# Patient Record
Sex: MALE | Employment: FULL TIME | URBAN - METROPOLITAN AREA
[De-identification: names, ages, dates, MRNs, and addresses within clinical notes are randomized per-mention and may not be internally consistent; named-entity substitution may affect disease eponyms.]

---

## 2017-10-03 ENCOUNTER — APPOINTMENT (OUTPATIENT)
Dept: GENERAL RADIOLOGY | Facility: CLINIC | Age: 22
End: 2017-10-03
Attending: EMERGENCY MEDICINE
Payer: COMMERCIAL

## 2017-10-03 ENCOUNTER — HOSPITAL ENCOUNTER (EMERGENCY)
Facility: CLINIC | Age: 22
Discharge: HOME | End: 2017-10-04
Attending: EMERGENCY MEDICINE | Admitting: PODIATRIST
Payer: COMMERCIAL

## 2017-10-03 DIAGNOSIS — S92.512B OPEN DISPLACED FRACTURE OF PROXIMAL PHALANX OF LESSER TOE OF LEFT FOOT, INITIAL ENCOUNTER: ICD-10-CM

## 2017-10-03 DIAGNOSIS — Z98.890 S/P FOOT SURGERY, LEFT: Primary | ICD-10-CM

## 2017-10-03 PROCEDURE — 28515 TREATMENT OF TOE FRACTURE: CPT | Mod: T1

## 2017-10-03 PROCEDURE — 73630 X-RAY EXAM OF FOOT: CPT | Mod: LT

## 2017-10-03 PROCEDURE — 25000132 ZZH RX MED GY IP 250 OP 250 PS 637: Performed by: EMERGENCY MEDICINE

## 2017-10-03 PROCEDURE — 99285 EMERGENCY DEPT VISIT HI MDM: CPT | Mod: 25

## 2017-10-03 RX ORDER — HYDROCODONE BITARTRATE AND ACETAMINOPHEN 5; 325 MG/1; MG/1
1 TABLET ORAL ONCE
Status: COMPLETED | OUTPATIENT
Start: 2017-10-03 | End: 2017-10-03

## 2017-10-03 RX ADMIN — HYDROCODONE BITARTRATE AND ACETAMINOPHEN 1 TABLET: 5; 325 TABLET ORAL at 23:00

## 2017-10-03 ASSESSMENT — ENCOUNTER SYMPTOMS: WEAKNESS: 1

## 2017-10-03 NOTE — IP AVS SNAPSHOT
MRN:[id number]                      After Visit Summary   10/3/2017    Chayito Zaman    MRN: [id number]           Thank you!     Thank you for choosing [facility] for your care. Our goal is always to provide you with excellent care. Hearing back from our patients is one way we can continue to improve our services. Please take a few minutes to complete the written survey that you may receive in the mail after you visit with us. Thank you!        Patient Information     Date Of Birth          1995        About your hospital stay     You were admitted on:  N/A You last received care in the:  [facility] OR    You were discharged on:  October 4, 2017       Who to Call     For medical emergencies, please call 911.  For non-urgent questions about your medical care, please call your primary care provider or clinic, None  For questions related to your surgery, please call your surgery clinic        Attending Provider     Provider Sajan Little MD Emergency Medicine       Primary Care Provider Fax #    Physician No Ref-Primary [phone]      After Care Instructions     Activity       1.  Perform the following activities every 2 hours x 5 minutes:  -ankle ROM/calf massaging bilateral lower extremity.  If you are not comfortable moving the surgical ankle, you can wiggle the toes on that foot  -deep breathing/coughing exercises  -ambulation; keep in mind your weightbearing restrictions            Discharge Instructions       Review discharge instructions as directed by Provider.            Discharge Instructions       Follow up with Dr Echols within 1 week of surgery            Elevate affected extremity       Elevate surgical limb above hip level 23/24 hours per day for aggressive swelling control.  This is mostly for the first 2 weeks after surgery            Ice to affected area       Apply ice pack to surgical site and behind left knee every 2 hours x 20 minutes.   Do not apply ice pack directly to skin.            No Alcohol       for 24 hours post-procedure            No dressing change       until follow up clinic appointment.            No driving or operating machinery       until the day after procedure            Weight bearing status - Partial       Partial WB on heel in surgical shoe                  Further instructions from your care team         POST-OPERATIVE INSTRUCTIONS  FOOT AND ANKLE SURGERY    These precautions MUST be followed for the first 24 hours after surgery:    Upon discharge, go directly home.    You must make arrangements to have a responsible adult stay with you.    DO NOT DRINK ALCOHOLIC BEVERAGES    It is not unusual to feel lightheaded up to 24 hours after surgery or while taking pain medication.  If you feel lightheaded, sit for a few minutes before standing and have someone assisted you when you get up to walk or use the restroom.    Do not use any mechanical equipment.    Do not make any important or legal decisions for 24 hours or while on pain medications.    You may experience dry mouth, sore throat, or sleep disturbances from the anesthesia and medications used during surgery.  Generalized muscle aches can sometimes occur.  These usually disappear in 12-24 hours.    POST-OPERATIVE CARE GUIDELINES    The following are general guidelines about what to expect the first days/weeks after surgery.  They are not specific, and your recovery may be slightly different.    Blood clots are not common, but are emergencies.  If you have sudden onset pain in your calf or leg, or have sudden shortness of breath, go to the Emergency Department.      Elevation of your operative foot/ankle is key to reducing the swelling in the immediate post-operative phase (first 3-5 days).  When you are at rest, elevate your foot at or above the level of your heart.  When sitting, your foot should be elevated on a chair or stool; not hanging down.      You should plan to  rest the day after surgery no matter how minor the procedure.  More complicated procedures will require more time to return to normal activity.      Foot and ankle surgery is painful in most cases - use your medication as directed for the first 24 hours after surgery.  It is not unusual for the pain to be worse a day or two after surgery than on the day of.  If your pain is more than 8/10 contact our office.  If you don t have pain, gradually decrease your pain meds by substituting Tylenol.       You will be prescribed Percocet or Vicodin for pain, Vistaril for spasms/pain adjunct, Senokot for constipation.  If you have had trouble taking these meds before or experience nausea or vomiting after surgery from your medication, please advise the recovery room nurses.  If you are already at home, try drinking only clear liquids and/or call our office.      All pain medications, along with inactivity can cause constipation.  Use the Senakot as directed, increase fluid intake to 1 quart per day and increase your dietary fiber.  (The  P  fruits - peaches, plums, pears, and prunes as well as anise/black licorice are recommended.)      It is not unusual to run a low-grade fever after surgery.  If your temperature is elevated above 102 , lasts longer than 24 hours, or is questionable in any way, contact our office.      Drainage from your cast/dressing is normal.  Reinforce your cast/dressing with 4x4 dressings and cover with an Ace wrap.  Wait until 24 hours after surgery to change your dressings; by this time most of your bleeding will have stopped.  If you have drainage through your dressings 2 days after surgery, contact our office.      You cast/dressing will be changed at your 1-week follow up appointment.  They should be kept clean and DRY.  If your cast/dressing is damaged before that, contact our office.      It is normal to experience some bruising in the toes after surgery and they may appear  dark  when your foot  hangs down.  It is important to actively wiggle your toes for at least 5-10 minutes each hour UNLESS you had surgery on those toes.      Use ice on your foot/ankle over the dressing/cast for 20 minutes per hour, 10 or more times per day.  A large bag of frozen peas works well.      Bathing: take a tub or sponge bath instead of a shower if possible during the first two weeks.  If you choose to shower, cover the dressing/cast with a waterproof covering, these may be ordered from www.seal-tight.com or are available at some pharmacies/medical supply stores.  Another option is XeroSox, which is the original vacuum sealed bandage and cast cover.  The cast cover has a vacuum seal and is absolutely waterproof.  [phone] or www.xerosox.[employer] for more information.      Driving:  For surgery on the left foot/ankle, you may drive as soon as narcotic medications are stopped.  For surgery on the right foot/ankle, you may drive when you are out of a cast, off pain medications, and you feel secure with braking.      In general, listen to your foot/ankle.  If you have a sudden, dramatic increase in pain that does not resolve after an hour or so, something is wrong - call our office.  If you fall or bump your foot/ankle and have sudden pain that resolves, give it 24 hours before you call.      Many of your questions can be addressed at your 1-week follow-up appointment - please make a list of things to ask us as they come up during your recovery.      If you had a nerve block it is common to have numbness in your leg and foot for up to 30 hours after surgery.  If your leg or foot is still numb more than 30 hours after surgery, please call the office.      Same Day Surgery Discharge Instructions for  Sedation and General Anesthesia       It's not unusual to feel dizzy, light-headed or faint for up to 24 hours after surgery or while taking pain medication.  If you have these symptoms: sit for a few minutes before standing and have  someone assist you when you get up to walk or use the bathroom.      You should rest and relax for the next 24 hours. We recommend you make arrangements to have an adult stay with you for at least 24 hours after your discharge.  Avoid hazardous and strenuous activity.      DO NOT DRIVE any vehicle or operate mechanical equipment for 24 hours following the end of your surgery.  Even though you may feel normal, your reactions may be affected by the medication you have received.      Do not drink alcoholic beverages for 24 hours following surgery.       Slowly progress to your regular diet as you feel able. It's not unusual to feel nauseated and/or vomit after receiving anesthesia.  If you develop these symptoms, drink clear liquids (apple juice, ginger ale, broth, 7-up, etc. ) until you feel better.  If your nausea and vomiting persists for 24 hours, please notify your surgeon.        All narcotic pain medications, along with inactivity and anesthesia, can cause constipation. Drinking plenty of liquids and increasing fiber intake will help.      For any questions of a medical nature, call your surgeon.      Do not make important decisions for 24 hours.      If you had general anesthesia, you may have a sore throat for a couple of days related to the breathing tube used during surgery.  You may use Cepacol lozenges to help with this discomfort.  If it worsens or if you develop a fever, contact your surgeon.       If you feel your pain is not well managed with the pain medications prescribed by your surgeon, please contact your surgeon's office to let them know so they can address your concerns.       **If you have questions or concerns about your procedure, call Dr. Echols at [phone]**       SAFETY TIPS & YOUR CRUTCHES     Crutch Fit     Assume good standing posture with shoulders relaxed and crutch tips 6-8 inches out from the side of the foot.       The underarm pad should fall 2-3 fingers width below the  armpit.       The handgrip is positioned level with the wrist to allow 30 degree flexion at the elbow.     Safety Tips     Bear weight on your hands, not on your armpits.       Do not add extra padding to the underarm pad. This will, in effect, lengthen the crutches and increase risk of nerve injury.       Wear flat, properly fitting shoes. Do not walk in stocking feet, high heels or slippers!       Household hazards:    Throw rugs should be removed from floors.    Stairs should be cleared of obstacles.    Secure handrails and use them when available    Use caution on slippery, highly polished, littered or uneven floor surfaces.    Check for electric cords.       Check crutch tips for excessive wear and keep wing nuts tight.       While walking, look forward with  head up  and  eyes up.  Take equal length steps.       Use both crutches.     Stairs     Up Good leg first, followed by  bad  leg, then crutches.     Down Crutches, followed by  bad  leg, good leg.     Walking With Crutches   Move both crutches forward at the same time.   _____ Non-weight bearing (NWB): Hold the involved leg up and swing through the crutches   with the involved leg. The involved leg does not touch the floor.   _____ Toe touch weight bearing (TTWB): Move the involved leg forward. Rest it lightly on the floor for balance only. Step through the crutches with the uninvolved leg.   _____ Partial weight bearing (PWB): Move the involved leg forward. Step down the weight of the leg only. Step through the crutches with the uninvolved leg.   _____ Weight Bearing As Tolerated (WBAT): Move the involved leg forward. Put as much pressure through the involved leg as you can tolerate comfortably. Then step through the crutches with your uninvolved leg.     [id number]   MAY/2008             Pending Results     Date and Time Order Name Status Description    10/4/2017 0253 XR Foot Port Left 2 Views In process             Admission Information     Date & Time  "Department Dept. Phone    10/3/2017 [facility] [phone]      Your Vitals Were     Blood Pressure Pulse Temperature Respirations Height Weight    98/55 100 96.6  F (35.9  C) 16 1.702 m (5' 7\") 59 kg (130 lb)    Pulse Oximetry BMI (Body Mass Index)                100% 20.36 kg/m2          [employer] Information     [employer] lets you send messages to your doctor, view your test results, renew your prescriptions, schedule appointments and more. To sign up, go to [email or web address] . Click on \"Log in\" on the left side of the screen, which will take you to the Welcome page. Then click on \"Sign up Now\" on the right side of the page.     You will be asked to enter the access code listed below, as well as some personal information. Please follow the directions to create your username and password.     Your access code is: [email or web address]  Expires: 2018  3:00 AM     Your access code will  in 90 days. If you need help or a new code, please call your [facility] clinic or [phone].        Care EveryWhere ID     This is your Care EveryWhere ID. This could be used by other organizations to access your [facility] medical records  [id number]        Equal Access to Services     IVONNE VAUGHAN AH: Hadii minh hongo Soana lilia, waaxda luqadaha, qaybta kaalmada adeegyada, ree hodgson . So [facility] [phone].    ATENCIÓN: Si habla español, tiene a little disposición servicios gratuitos de asistencia lingüística. Llame al [phone].    We comply with applicable federal civil rights laws and [place] laws. We do not discriminate on the basis of race, color, national origin, age, disability, sex, sexual orientation, or gender identity.               Review of your medicines      START taking        Dose / Directions    amoxicillin-clavulanate 875-125 MG per tablet   Commonly known as:  AUGMENTIN   Used for:  Open displaced fracture of proximal phalanx of lesser toe of left foot, initial " encounter, S/P foot surgery, left        Dose:  1 tablet   Take 1 tablet by mouth 2 times daily   Quantity:  20 tablet   Refills:  0       HYDROcodone-acetaminophen 5-325 MG per tablet   Commonly known as:  NORCO   Used for:  Open displaced fracture of proximal phalanx of lesser toe of left foot, initial encounter, S/P foot surgery, left        Take 1-2 tablets every 4-6 hours as needed for pain.  Do not take other tylenol products with this medication, as too much tylenol can be damaging to the liver.   Quantity:  40 tablet   Refills:  0       hydrOXYzine 25 MG tablet   Commonly known as:  ATARAX   Used for:  S/P foot surgery, left, Open displaced fracture of proximal phalanx of lesser toe of left foot, initial encounter        Take 1-2 tablets every 4-6 hours as needed for pain control.   Quantity:  40 tablet   Refills:  0       senna-docusate 8.6-50 MG per tablet   Commonly known as:  SENOKOT-S;PERICOLACE   Used for:  S/P foot surgery, left, Open displaced fracture of proximal phalanx of lesser toe of left foot, initial encounter        Take 2 tablets daily as needed for constipation while taking prescription pain medications.   Quantity:  30 tablet   Refills:  0            Where to get your medicines      Some of these will need a paper prescription and others can be bought over the counter. Ask your nurse if you have questions.     Bring a paper prescription for each of these medications     amoxicillin-clavulanate 875-125 MG per tablet    HYDROcodone-acetaminophen 5-325 MG per tablet    hydrOXYzine 25 MG tablet    senna-docusate 8.6-50 MG per tablet               ANTIBIOTIC INSTRUCTION     You've Been Prescribed an Antibiotic - Now What?  Your healthcare team thinks that you or your loved one might have an infection. Some infections can be treated with antibiotics, which are powerful, life-saving drugs. Like all medications, antibiotics have side effects and should only be used when necessary. There are some  important things you should know about your antibiotic treatment.      Your healthcare team may run tests before you start taking an antibiotic.    Your team may take samples (e.g., from your blood, urine or other areas) to run tests to look for bacteria. These test can be important to determine if you need an antibiotic at all and, if you do, which antibiotic will work best.      Within a few days, your healthcare team might change or even stop your antibiotic.    Your team may start you on an antibiotic while they are working to find out what is making you sick.    Your team might change your antibiotic because test results show that a different antibiotic would be better to treat your infection.    In some cases, once your team has more information, they learn that you do not need an antibiotic at all. They may find out that you don't have an infection, or that the antibiotic you're taking won't work against your infection. For example, an infection caused by a virus can't be treated with antibiotics. Staying on an antibiotic when you don't need it is more likely to be harmful than helpful.      You may experience side effects from your antibiotic.    Like all medications, antibiotics have side effects. Some of these can be serious.    Let you healthcare team know if you have any known allergies when you are admitted to the hospital.    One significant side effect of nearly all antibiotics is the risk of severe and sometimes deadly diarrhea caused by Clostridium difficile (C. Difficile). This occurs when a person takes antibiotics because some good germs are destroyed. Antibiotic use allows C. diificile to take over, putting patients at high risk for this serious infection.    As a patient or caregiver, it is important to understand your or your loved one's antibiotic treatment. It is especially important for caregivers to speak up when patients can't speak for themselves. Here are some important questions to ask  your healthcare team.    What infection is this antibiotic treating and how do you know I have that infection?    What side effects might occur from this antibiotic?    How long will I need to take this antibiotic?    Is it safe to take this antibiotic with other medications or supplements (e.g., vitamins) that I am taking?     Are there any special directions I need to know about taking this antibiotic? For example, should I take it with food?    How will I be monitored to know whether my infection is responding to the antibiotic?    What tests may help to make sure the right antibiotic is prescribed for me?      Information provided by:  www.cdc.gov/getsmart  U.S. Department of Health and Human Services  Centers for disease Control and Prevention  National Center for Emerging and Zoonotic Infectious Diseases  Division of Healthcare Quality Promotion         Protect others around you: Learn how to safely use, store and throw away your medicines at www.disposemymeds.org.             Medication List: This is a list of all your medications and when to take them. Check marks below indicate your daily home schedule. Keep this list as a reference.      Medications           Morning Afternoon Evening Bedtime As Needed    amoxicillin-clavulanate 875-125 MG per tablet   Commonly known as:  AUGMENTIN   Take 1 tablet by mouth 2 times daily   Last time this was given:  1 tablet on 10/4/2017 12:31 AM                                HYDROcodone-acetaminophen 5-325 MG per tablet   Commonly known as:  NORCO   Take 1-2 tablets every 4-6 hours as needed for pain.  Do not take other tylenol products with this medication, as too much tylenol can be damaging to the liver.   Last time this was given:  1 tablet on 10/3/2017 11:00 PM                                hydrOXYzine 25 MG tablet   Commonly known as:  ATARAX   Take 1-2 tablets every 4-6 hours as needed for pain control.                                senna-docusate 8.6-50 MG per  tablet   Commonly known as:  SENOKOT-S;PERICOLACE   Take 2 tablets daily as needed for constipation while taking prescription pain medications.

## 2017-10-03 NOTE — LETTER
[facility] [place]  [address] [place] [address]  [phone]          October 4, 2017    RE:  Chayito Zaman                                                                                                                                                       [address]  [facility] [address]            To whom it may concern:    Chayito Zaman is under my professional care for an open toe fracture on his left foot that required surgery.  Because of the surgery, and the resulting pin that was placed into his toe, he will be out of work until further notice for proper post-operative healing.    Thank you        Saul Echols, AMAIRANI   Podiatric Foot & Ankle Surgeon  [facility]

## 2017-10-03 NOTE — IP AVS SNAPSHOT
[facility] OR    [facility] [place] [address]    Phone:  [phone]    Fax:  [phone]                                       After Visit Summary   10/3/2017    Chayito Zaman    MRN: [id number]           After Visit Summary Signature Page     I have received my discharge instructions, and my questions have been answered. I have discussed any challenges I see with this plan with the nurse or doctor.    ..........................................................................................................................................  Patient/Patient Representative Signature      ..........................................................................................................................................  Patient Representative Print Name and Relationship to Patient    ..................................................               ................................................  Date                                            Time    ..........................................................................................................................................  Reviewed by Signature/Title    ...................................................              ..............................................  Date                                                            Time

## 2017-10-04 ENCOUNTER — ANESTHESIA EVENT (OUTPATIENT)
Dept: SURGERY | Facility: CLINIC | Age: 22
End: 2017-10-04
Payer: COMMERCIAL

## 2017-10-04 ENCOUNTER — APPOINTMENT (OUTPATIENT)
Dept: GENERAL RADIOLOGY | Facility: CLINIC | Age: 22
End: 2017-10-04
Attending: PODIATRIST
Payer: COMMERCIAL

## 2017-10-04 ENCOUNTER — ANESTHESIA (OUTPATIENT)
Dept: SURGERY | Facility: CLINIC | Age: 22
End: 2017-10-04
Payer: COMMERCIAL

## 2017-10-04 VITALS
TEMPERATURE: 97.3 F | HEIGHT: 67 IN | BODY MASS INDEX: 20.4 KG/M2 | DIASTOLIC BLOOD PRESSURE: 64 MMHG | OXYGEN SATURATION: 100 % | SYSTOLIC BLOOD PRESSURE: 100 MMHG | HEART RATE: 100 BPM | RESPIRATION RATE: 15 BRPM | WEIGHT: 130 LBS

## 2017-10-04 PROCEDURE — 28525 TREAT TOE FRACTURE: CPT | Mod: T1 | Performed by: PODIATRIST

## 2017-10-04 PROCEDURE — 36000069 ZZH SURGERY LEVEL 5 EA 15 ADDTL MIN: Performed by: PODIATRIST

## 2017-10-04 PROCEDURE — 36000071 ZZH SURGERY LEVEL 5 W FLUORO 1ST 30 MIN: Performed by: PODIATRIST

## 2017-10-04 PROCEDURE — 40000985 XR FOOT PORT LT 2 VW: Mod: LT

## 2017-10-04 PROCEDURE — 25000132 ZZH RX MED GY IP 250 OP 250 PS 637: Performed by: EMERGENCY MEDICINE

## 2017-10-04 PROCEDURE — 25000128 H RX IP 250 OP 636: Performed by: NURSE ANESTHETIST, CERTIFIED REGISTERED

## 2017-10-04 PROCEDURE — 25000125 ZZHC RX 250: Performed by: PODIATRIST

## 2017-10-04 PROCEDURE — 25000128 H RX IP 250 OP 636: Performed by: PODIATRIST

## 2017-10-04 PROCEDURE — 27210995 ZZH RX 272: Performed by: PODIATRIST

## 2017-10-04 PROCEDURE — 25000125 ZZHC RX 250: Performed by: NURSE ANESTHETIST, CERTIFIED REGISTERED

## 2017-10-04 PROCEDURE — S0020 INJECTION, BUPIVICAINE HYDRO: HCPCS | Performed by: PODIATRIST

## 2017-10-04 PROCEDURE — 27210794 ZZH OR GENERAL SUPPLY STERILE: Performed by: PODIATRIST

## 2017-10-04 PROCEDURE — 40000170 ZZH STATISTIC PRE-PROCEDURE ASSESSMENT II: Performed by: PODIATRIST

## 2017-10-04 PROCEDURE — 40000277 XR SURGERY CARM FLUORO LESS THAN 5 MIN W STILLS

## 2017-10-04 PROCEDURE — 37000009 ZZH ANESTHESIA TECHNICAL FEE, EACH ADDTL 15 MIN: Performed by: PODIATRIST

## 2017-10-04 PROCEDURE — 71000012 ZZH RECOVERY PHASE 1 LEVEL 1 FIRST HR: Performed by: PODIATRIST

## 2017-10-04 PROCEDURE — 37000008 ZZH ANESTHESIA TECHNICAL FEE, 1ST 30 MIN: Performed by: PODIATRIST

## 2017-10-04 DEVICE — IMP WIRE KIRSCHNER 0.062X4" 78.2030: Type: IMPLANTABLE DEVICE | Site: TOE | Status: FUNCTIONAL

## 2017-10-04 RX ORDER — ONDANSETRON 2 MG/ML
INJECTION INTRAMUSCULAR; INTRAVENOUS PRN
Status: DISCONTINUED | OUTPATIENT
Start: 2017-10-04 | End: 2017-10-04

## 2017-10-04 RX ORDER — OXYCODONE HYDROCHLORIDE 5 MG/1
5-10 TABLET ORAL
Status: CANCELLED | OUTPATIENT
Start: 2017-10-04

## 2017-10-04 RX ORDER — MAGNESIUM HYDROXIDE 1200 MG/15ML
LIQUID ORAL PRN
Status: DISCONTINUED | OUTPATIENT
Start: 2017-10-04 | End: 2017-10-04 | Stop reason: HOSPADM

## 2017-10-04 RX ORDER — HYDROXYZINE HYDROCHLORIDE 25 MG/1
TABLET, FILM COATED ORAL
Qty: 40 TABLET | Refills: 0 | Status: SHIPPED | OUTPATIENT
Start: 2017-10-04 | End: 2017-10-23

## 2017-10-04 RX ORDER — CEFAZOLIN SODIUM 2 G/100ML
2 INJECTION, SOLUTION INTRAVENOUS
Status: COMPLETED | OUTPATIENT
Start: 2017-10-04 | End: 2017-10-04

## 2017-10-04 RX ORDER — CEFAZOLIN SODIUM 1 G/3ML
1 INJECTION, POWDER, FOR SOLUTION INTRAMUSCULAR; INTRAVENOUS SEE ADMIN INSTRUCTIONS
Status: DISCONTINUED | OUTPATIENT
Start: 2017-10-04 | End: 2017-10-04 | Stop reason: HOSPADM

## 2017-10-04 RX ORDER — HYDROCODONE BITARTRATE AND ACETAMINOPHEN 5; 325 MG/1; MG/1
TABLET ORAL
Qty: 40 TABLET | Refills: 0 | Status: SHIPPED | OUTPATIENT
Start: 2017-10-04 | End: 2017-10-23

## 2017-10-04 RX ORDER — AMOXICILLIN 250 MG
CAPSULE ORAL
Qty: 30 TABLET | Refills: 0 | Status: SHIPPED | OUTPATIENT
Start: 2017-10-04 | End: 2017-10-23

## 2017-10-04 RX ORDER — SODIUM CHLORIDE, SODIUM LACTATE, POTASSIUM CHLORIDE, CALCIUM CHLORIDE 600; 310; 30; 20 MG/100ML; MG/100ML; MG/100ML; MG/100ML
INJECTION, SOLUTION INTRAVENOUS CONTINUOUS PRN
Status: DISCONTINUED | OUTPATIENT
Start: 2017-10-04 | End: 2017-10-04

## 2017-10-04 RX ORDER — EPHEDRINE SULFATE 50 MG/ML
INJECTION, SOLUTION INTRAMUSCULAR; INTRAVENOUS; SUBCUTANEOUS PRN
Status: DISCONTINUED | OUTPATIENT
Start: 2017-10-04 | End: 2017-10-04

## 2017-10-04 RX ORDER — PROPOFOL 10 MG/ML
INJECTION, EMULSION INTRAVENOUS PRN
Status: DISCONTINUED | OUTPATIENT
Start: 2017-10-04 | End: 2017-10-04

## 2017-10-04 RX ORDER — PROPOFOL 10 MG/ML
INJECTION, EMULSION INTRAVENOUS CONTINUOUS PRN
Status: DISCONTINUED | OUTPATIENT
Start: 2017-10-04 | End: 2017-10-04

## 2017-10-04 RX ORDER — BUPIVACAINE HYDROCHLORIDE 5 MG/ML
INJECTION, SOLUTION EPIDURAL; INTRACAUDAL PRN
Status: DISCONTINUED | OUTPATIENT
Start: 2017-10-04 | End: 2017-10-04 | Stop reason: HOSPADM

## 2017-10-04 RX ORDER — LIDOCAINE HYDROCHLORIDE 20 MG/ML
INJECTION, SOLUTION INFILTRATION; PERINEURAL PRN
Status: DISCONTINUED | OUTPATIENT
Start: 2017-10-04 | End: 2017-10-04

## 2017-10-04 RX ORDER — FENTANYL CITRATE 50 UG/ML
INJECTION, SOLUTION INTRAMUSCULAR; INTRAVENOUS PRN
Status: DISCONTINUED | OUTPATIENT
Start: 2017-10-04 | End: 2017-10-04

## 2017-10-04 RX ADMIN — DEXMEDETOMIDINE HYDROCHLORIDE 12 MCG: 100 INJECTION, SOLUTION INTRAVENOUS at 01:44

## 2017-10-04 RX ADMIN — PROPOFOL 30 MG: 10 INJECTION, EMULSION INTRAVENOUS at 01:45

## 2017-10-04 RX ADMIN — SODIUM CHLORIDE, POTASSIUM CHLORIDE, SODIUM LACTATE AND CALCIUM CHLORIDE: 600; 310; 30; 20 INJECTION, SOLUTION INTRAVENOUS at 01:20

## 2017-10-04 RX ADMIN — PROPOFOL 30 MCG/KG/MIN: 10 INJECTION, EMULSION INTRAVENOUS at 01:45

## 2017-10-04 RX ADMIN — Medication 5 MG: at 01:49

## 2017-10-04 RX ADMIN — FENTANYL CITRATE 50 MCG: 50 INJECTION, SOLUTION INTRAMUSCULAR; INTRAVENOUS at 01:45

## 2017-10-04 RX ADMIN — Medication 5 MG: at 02:25

## 2017-10-04 RX ADMIN — LIDOCAINE HYDROCHLORIDE 60 MG: 20 INJECTION, SOLUTION INFILTRATION; PERINEURAL at 01:45

## 2017-10-04 RX ADMIN — DEXMEDETOMIDINE HYDROCHLORIDE 8 MCG: 100 INJECTION, SOLUTION INTRAVENOUS at 01:47

## 2017-10-04 RX ADMIN — AMOXICILLIN AND CLAVULANATE POTASSIUM 1 TABLET: 875; 125 TABLET, FILM COATED ORAL at 00:31

## 2017-10-04 RX ADMIN — ONDANSETRON 4 MG: 2 INJECTION INTRAMUSCULAR; INTRAVENOUS at 01:52

## 2017-10-04 RX ADMIN — MIDAZOLAM HYDROCHLORIDE 2 MG: 1 INJECTION, SOLUTION INTRAMUSCULAR; INTRAVENOUS at 01:39

## 2017-10-04 RX ADMIN — CEFAZOLIN SODIUM 2 G: 2 INJECTION, SOLUTION INTRAVENOUS at 01:45

## 2017-10-04 RX ADMIN — PROPOFOL 20 MG: 10 INJECTION, EMULSION INTRAVENOUS at 01:47

## 2017-10-04 RX ADMIN — Medication 5 MG: at 02:34

## 2017-10-04 ASSESSMENT — ENCOUNTER SYMPTOMS: SEIZURES: 0

## 2017-10-04 ASSESSMENT — LIFESTYLE VARIABLES: TOBACCO_USE: 1

## 2017-10-04 NOTE — ANESTHESIA POSTPROCEDURE EVALUATION
Patient: Chayito Zaman    Procedure(s):  Irrigation and Debridement of Left Second Toe Open Fracture with Pinning - Wound Class: IV-Dirty or Infected   - Wound Class: IV-Dirty or Infected    Diagnosis:Open Left Second Toe Fracture  Diagnosis Additional Information: No value filed.    Anesthesia Type:  MAC    Note:  Anesthesia Post Evaluation    Patient location during evaluation: PACU  Patient participation: Able to fully participate in evaluation  Level of consciousness: awake and awake and alert  Pain management: adequate  Airway patency: patent  Cardiovascular status: acceptable  Respiratory status: acceptable  Hydration status: acceptable  PONV: none     Anesthetic complications: None          Last vitals:  Vitals:    10/04/17 0340 10/04/17 0350 10/04/17 0400   BP: 101/64 100/64    Pulse:      Resp: 16 15    Temp: 36.2  C (97.2  F) 36.3  C (97.3  F)    SpO2: 97% 98% 100%         Electronically Signed By: Rabia Landis  October 4, 2017  5:53 AM

## 2017-10-04 NOTE — OP NOTE
DATE OF SURGERY:  10/04/2017.      SURGEON:  Saul Echols DPM       ASSISTANT:  None.      PREOPERATIVE DIAGNOSIS:  Open left second toe fracture with extruded fracture fragment.      POSTOPERATIVE DIAGNOSIS:  Open left second toe fracture with extruded fracture fragment.      PROCEDURE:  Irrigation and debridement of toe fracture with removal of fracture fragment and pinning of digit.      PATHOLOGY:  None.      ANESTHESIA:  MAC with local.      HEMOSTASIS:  None.      ESTIMATED BLOOD LOSS:  Less than 1 mL.      MATERIALS:  Smooth 0.062 K-wires x1.      COMPLICATIONS:  None apparent.      INDICATIONS FOR PROCEDURE:  Chayito Zaman is a 22-year-old male who was seen in the [facility] Emergency Department this past evening.  Around 9:45 last night, a shower door closed on his toe.  He noticed significant amount of bleeding and when trying to clean up the toe noticed something white on top of the toe which ended up being a portion of the proximal phalanx.  He was seen in the Emergency Department.  I was in communication with the Emergency Department physician.  He washed out the toe and attempted to reduce the fracture fragment, but this was unsuccessful, and so the patient was brought to the operating room.      DESCRIPTION OF PROCEDURE:  After obtaining written consent, the patient was transferred to the operating room, placed in a supine position on the well-padded operating room table.  IV sedation was initiated.  The foot was anesthetized with a preoperative local.  It was then prepped and draped in normal aseptic fashion.  No tourniquet was used.      Attention was directed to the left second toe.  The medial head of the proximal phalanx was outside of the wound.  This was still attached to a portion of the medial collateral ligament.  I ran 1 L of antibiotic-impregnated normal saline through the base of the wound and an additional 0.5 L of normal saline.  There was no gross contamination or debris  noted at the base of the wound.  Handling the tissue with care, I distracted the toe in an attempt to reduce the fracture fragment.  This was unstable and did not seat in position appropriately.  I was eventually able to get it into position, but with movement of the toe, it would spontaneously sublux.  I then placed a smooth 0.062 K-wire through the toe down to the base of the proximal phalanx in hopes of stabilizing the toe and reducing spontaneous subluxation.  Unfortunately, the fragment would not stay in position.  I then attempted to stabilize the fracture fragment with repair of the damaged extensor tendon apparatus.  This was repaired over the fracture fragment in the proper position.  Unfortunately, imaging showed spontaneous subluxation of the fracture fragment.  The fragment was too small to fixate with the screw, and so unfortunately, this had to be removed.  The extensor tendon apparatus was then repaired again after removal of the fracture fragment.  Intraoperative imaging showed good placement of the pin and good alignment of the second toe.  The complex skin laceration was then repaired with 5-0 nylon.      A dry sterile dressing was applied to the patient's left foot.  He appeared to tolerate the procedure and anesthesia well and was transferred to the PACU with vital signs stable and vascular status intact to all digits of the foot.      PLAN:  The patient  will be heel weightbearing in a postoperative shoe and will follow up with me in clinic in approximately 1 week.  His tetanus status was addressed in the Emergency Department and I placed him on a 10-day course of Augmentin.         SAUL VELAZQUEZ DPM             D: 10/04/2017 02:55   T: 10/04/2017 10:49   MT: TS      Name:     DANILO HARRIS   MRN:      [id number]        Account:        [id number]   :      1995           Procedure Date: 10/04/2017      Document: [id number]       cc: Saul Velazquez DPM

## 2017-10-04 NOTE — DISCHARGE INSTRUCTIONS
POST-OPERATIVE INSTRUCTIONS  FOOT AND ANKLE SURGERY    These precautions MUST be followed for the first 24 hours after surgery:    Upon discharge, go directly home.    You must make arrangements to have a responsible adult stay with you.    DO NOT DRINK ALCOHOLIC BEVERAGES    It is not unusual to feel lightheaded up to 24 hours after surgery or while taking pain medication.  If you feel lightheaded, sit for a few minutes before standing and have someone assisted you when you get up to walk or use the restroom.    Do not use any mechanical equipment.    Do not make any important or legal decisions for 24 hours or while on pain medications.    You may experience dry mouth, sore throat, or sleep disturbances from the anesthesia and medications used during surgery.  Generalized muscle aches can sometimes occur.  These usually disappear in 12-24 hours.    POST-OPERATIVE CARE GUIDELINES    The following are general guidelines about what to expect the first days/weeks after surgery.  They are not specific, and your recovery may be slightly different.    Blood clots are not common, but are emergencies.  If you have sudden onset pain in your calf or leg, or have sudden shortness of breath, go to the Emergency Department.      Elevation of your operative foot/ankle is key to reducing the swelling in the immediate post-operative phase (first 3-5 days).  When you are at rest, elevate your foot at or above the level of your heart.  When sitting, your foot should be elevated on a chair or stool; not hanging down.      You should plan to rest the day after surgery no matter how minor the procedure.  More complicated procedures will require more time to return to normal activity.      Foot and ankle surgery is painful in most cases - use your medication as directed for the first 24 hours after surgery.  It is not unusual for the pain to be worse a day or two after surgery than on the day of.  If your pain is more than 8/10  contact our office.  If you don t have pain, gradually decrease your pain meds by substituting Tylenol.       You will be prescribed Percocet or Vicodin for pain, Vistaril for spasms/pain adjunct, Senokot for constipation.  If you have had trouble taking these meds before or experience nausea or vomiting after surgery from your medication, please advise the recovery room nurses.  If you are already at home, try drinking only clear liquids and/or call our office.      All pain medications, along with inactivity can cause constipation.  Use the Senakot as directed, increase fluid intake to 1 quart per day and increase your dietary fiber.  (The  P  fruits - peaches, plums, pears, and prunes as well as anise/black licorice are recommended.)      It is not unusual to run a low-grade fever after surgery.  If your temperature is elevated above 102 , lasts longer than 24 hours, or is questionable in any way, contact our office.      Drainage from your cast/dressing is normal.  Reinforce your cast/dressing with 4x4 dressings and cover with an Ace wrap.  Wait until 24 hours after surgery to change your dressings; by this time most of your bleeding will have stopped.  If you have drainage through your dressings 2 days after surgery, contact our office.      You cast/dressing will be changed at your 1-week follow up appointment.  They should be kept clean and DRY.  If your cast/dressing is damaged before that, contact our office.      It is normal to experience some bruising in the toes after surgery and they may appear  dark  when your foot hangs down.  It is important to actively wiggle your toes for at least 5-10 minutes each hour UNLESS you had surgery on those toes.      Use ice on your foot/ankle over the dressing/cast for 20 minutes per hour, 10 or more times per day.  A large bag of frozen peas works well.      Bathing: take a tub or sponge bath instead of a shower if possible during the first two weeks.  If you choose  to shower, cover the dressing/cast with a waterproof covering, these may be ordered from www.seal-tight.com or are available at some pharmacies/medical supply stores.  Another option is XeroSox, which is the original vacuum sealed bandage and cast cover.  The cast cover has a vacuum seal and is absolutely waterproof.  7-008-874-6157 or www.xerosox.[employer] for more information.      Driving:  For surgery on the left foot/ankle, you may drive as soon as narcotic medications are stopped.  For surgery on the right foot/ankle, you may drive when you are out of a cast, off pain medications, and you feel secure with braking.      In general, listen to your foot/ankle.  If you have a sudden, dramatic increase in pain that does not resolve after an hour or so, something is wrong - call our office.  If you fall or bump your foot/ankle and have sudden pain that resolves, give it 24 hours before you call.      Many of your questions can be addressed at your 1-week follow-up appointment - please make a list of things to ask us as they come up during your recovery.      If you had a nerve block it is common to have numbness in your leg and foot for up to 30 hours after surgery.  If your leg or foot is still numb more than 30 hours after surgery, please call the office.      Same Day Surgery Discharge Instructions for  Sedation and General Anesthesia       It's not unusual to feel dizzy, light-headed or faint for up to 24 hours after surgery or while taking pain medication.  If you have these symptoms: sit for a few minutes before standing and have someone assist you when you get up to walk or use the bathroom.      You should rest and relax for the next 24 hours. We recommend you make arrangements to have an adult stay with you for at least 24 hours after your discharge.  Avoid hazardous and strenuous activity.      DO NOT DRIVE any vehicle or operate mechanical equipment for 24 hours following the end of your surgery.  Even though  you may feel normal, your reactions may be affected by the medication you have received.      Do not drink alcoholic beverages for 24 hours following surgery.       Slowly progress to your regular diet as you feel able. It's not unusual to feel nauseated and/or vomit after receiving anesthesia.  If you develop these symptoms, drink clear liquids (apple juice, ginger ale, broth, 7-up, etc. ) until you feel better.  If your nausea and vomiting persists for 24 hours, please notify your surgeon.        All narcotic pain medications, along with inactivity and anesthesia, can cause constipation. Drinking plenty of liquids and increasing fiber intake will help.      For any questions of a medical nature, call your surgeon.      Do not make important decisions for 24 hours.      If you had general anesthesia, you may have a sore throat for a couple of days related to the breathing tube used during surgery.  You may use Cepacol lozenges to help with this discomfort.  If it worsens or if you develop a fever, contact your surgeon.       If you feel your pain is not well managed with the pain medications prescribed by your surgeon, please contact your surgeon's office to let them know so they can address your concerns.       **If you have questions or concerns about your procedure, call Dr. Echols at [phone]**       SAFETY TIPS & YOUR CRUTCHES     Crutch Fit     Assume good standing posture with shoulders relaxed and crutch tips 6-8 inches out from the side of the foot.       The underarm pad should fall 2-3 fingers width below the armpit.       The handgrip is positioned level with the wrist to allow 30 degree flexion at the elbow.     Safety Tips     Bear weight on your hands, not on your armpits.       Do not add extra padding to the underarm pad. This will, in effect, lengthen the crutches and increase risk of nerve injury.       Wear flat, properly fitting shoes. Do not walk in stocking feet, high heels or  slippers!       Household hazards:    Throw rugs should be removed from floors.    Stairs should be cleared of obstacles.    Secure handrails and use them when available    Use caution on slippery, highly polished, littered or uneven floor surfaces.    Check for electric cords.       Check crutch tips for excessive wear and keep wing nuts tight.       While walking, look forward with  head up  and  eyes up.  Take equal length steps.       Use both crutches.     Stairs     Up Good leg first, followed by  bad  leg, then crutches.     Down Crutches, followed by  bad  leg, good leg.     Walking With Crutches   Move both crutches forward at the same time.   _____ Non-weight bearing (NWB): Hold the involved leg up and swing through the crutches   with the involved leg. The involved leg does not touch the floor.   _____ Toe touch weight bearing (TTWB): Move the involved leg forward. Rest it lightly on the floor for balance only. Step through the crutches with the uninvolved leg.   _____ Partial weight bearing (PWB): Move the involved leg forward. Step down the weight of the leg only. Step through the crutches with the uninvolved leg.   _____ Weight Bearing As Tolerated (WBAT): Move the involved leg forward. Put as much pressure through the involved leg as you can tolerate comfortably. Then step through the crutches with your uninvolved leg.     [id number]   MAY/2008

## 2017-10-04 NOTE — BRIEF OP NOTE
[facility] Brief Operative Note    Pre-operative diagnosis: Open Left Second Toe Fracture   Post-operative diagnosis sp I&D open left 2nd toe fracture with pinning     Procedure: Procedure(s):  Irrigation and Debridement of Left Second Toe Open Fracture with Pinning - Wound Class: IV-Dirty or Infected   - Wound Class: IV-Dirty or Infected   Surgeon(s): Surgeon(s) and Role:     * Saul Echols DPM - Primary   Estimated blood loss: 1 mL    Specimens: * No specimens in log *   Findings: Unstable fracture fragment, too small to fixate and would spontaneously sublux.  Fragment was removed.  Toe pinned in rectus position.

## 2017-10-04 NOTE — ED NOTES
Assessment completed - per flowsheet. Pt marked ready for XR. Female friend at bedside able to drive pt home, as needed.

## 2017-10-04 NOTE — ED NOTES
PT becoming increasingly agitated, complaining about parking, complaining about waiting. This writer informed the PT that the ER DR is communicating with the ortho DR to give the best possible care in regards to his open fx. RN notified.

## 2017-10-04 NOTE — OR NURSING
D/C instructions given and crutches instructions given with demonstration.  Pt advised to go slowly, carefully, and best to sit down and go up stairs to apt in sitting position.

## 2017-10-04 NOTE — ANESTHESIA PREPROCEDURE EVALUATION
Procedure: Procedure(s):  COMBINED IRRIGATION AND DEBRIDEMENT TOE  Preop diagnosis: Open Left Second Toe Fracture    No Known Allergies  History reviewed. No pertinent past medical history.  History reviewed. No pertinent surgical history.  Social History   Substance Use Topics     Smoking status: Current Every Day Smoker     Packs/day: 0.50     Smokeless tobacco: Never Used     Alcohol use Yes      Comment: Occ.     Prior to Admission medications    Not on File     No current Epic-ordered facility-administered medications on file.      No current Epic-ordered outpatient prescriptions on file.       Wt Readings from Last 1 Encounters:   10/03/17 59 kg (130 lb)     Temp Readings from Last 1 Encounters:   10/03/17 36.9  C (98.4  F)     BP Readings from Last 6 Encounters:   10/03/17 (!) 117/99     Pulse Readings from Last 4 Encounters:   10/03/17 99     Resp Readings from Last 1 Encounters:   10/03/17 18     SpO2 Readings from Last 1 Encounters:   10/03/17 100%     No results for input(s): NA, POTASSIUM, CHLORIDE, CO2, ANIONGAP, GLC, BUN, CR, ROSIE in the last 48896 hours.    Recent Results (from the past 744 hour(s))   Foot XR, G/E 3 views, left    Narrative    XR FOOT LT G/E 3 VW  10/3/2017 11:12 PM     INDICATION: Injury left second toe, clinically open fracture of  phalanx, evaluate trauma.    COMPARISON: None.      Impression    IMPRESSION: Left second toe mildly comminuted fractures involving the  distal aspect of the proximal phalanx and the proximal to mid aspect  of the middle phalanx. The proximal phalanx fracture is slightly more  comminuted and displaced, extending into the PIP joint space.    JOE KWAN MD         Anesthesia Evaluation     .             ROS/MED HX    ENT/Pulmonary:     (+)sleep apnea, tobacco use, , . .   (-) asthma   Neurologic:      (-) seizures and CVA   Cardiovascular:         METS/Exercise Tolerance:     Hematologic:         Musculoskeletal:         GI/Hepatic:        (-) GERD    Renal/Genitourinary:         Endo:         Psychiatric:         Infectious Disease:         Malignancy:         Other:                     Physical Exam  Normal systems: dental    Airway   Mallampati: II  TM distance: >3 FB  Neck ROM: full    Dental     Cardiovascular       Pulmonary                     Anesthesia Plan      History & Physical Review  History and physical reviewed and following examination; no interval change.    ASA Status:  2 .        Plan for MAC          Postoperative Care      Consents  Anesthetic plan, risks, benefits and alternatives discussed with:  Patient..                          .

## 2017-10-04 NOTE — ED PROVIDER NOTES
"  History     Chief Complaint:  Foot Injury    HPI   Chayito Zaman is a 22 year old male who presents to the emergency department today for evaluation of a foot injury and is accompanied by his friend. The patient was leaving his shower when the shower door fell off of its hinges, without shattering, and fell onto the patient's left foot. Here, the patient reports a laceration and numbness to his left 2nd toe, and denies any other injuries. Denies falling. Denies other injury to the foot or leg. Bleeding controlled PTA. Reports tetanus updated in August of this year.     Allergies:  No Known Drug Allergies    Medications:    The patient is currently on no regular medications.      Past Medical History:    History reviewed. No pertinent past medical history.    Past Surgical History:    History reviewed. No pertinent past surgical history.    Family History:    History reviewed. No pertinent family history.     Social History:  The patient was accompanied to the ED by a friend.  Smoking Status: Current Every Day Smoker, 0.5 ppd  Smokeless Tobacco: Never used  Alcohol Use: Yes  Marital Status:  Single [1]    Review of Systems   Skin: Positive for rash.   Neurological: Positive for weakness.   All other systems reviewed and are negative.    Physical Exam   Vitals:  Patient Vitals for the past 24 hrs:   BP Temp Pulse Resp SpO2 Height Weight   10/03/17 2232 (!) 117/99 98.4  F (36.9  C) 99 18 100 % 1.702 m (5' 7\") 59 kg (130 lb)     Physical Exam  General: Resting comfortably  Head:  Scalp, face, and head appear normal  Eyes:  Pupils equal, round, and reactive to light    Conjunctivae injected bilaterally and sclera white  ENT:    The nose is normal    Ears/pinnae are normal  Neck:  Normal range of motion  CV:  RRR, no M/R/G  Resp:  CTAB, no increased WOB  MSK:  Left 2nd toe with open fracture of the phalanges and extrusion of bone fragment through the wound. Laceration 2.3cm in length, complex with tissue flap. No " injury to the plantar surface of the toe. Sensation intact to light touch in all toes. Extension and flexion of the toe limited. No other injuries or tenderness to the other toes, foot, ankle, lower leg, or knee. RLE normal.  Skin:  No rash or lesions noted except as noted above.  Neuro:  Speech is normal and fluent    Moves all extremities spontaneously  Psych: Awake, Alert. Normal affect      Appropriate interactions                  Emergency Department Course     Imaging:  Radiology findings were communicated with the patient who voiced understanding of the findings.    Foot XR, G/E 3 Views, left  Left second toe mildly comminuted fractures involving the  distal aspect of the proximal phalanx and the proximal to mid aspect  of the middle phalanx. The proximal phalanx fracture is slightly more  comminuted and displaced, extending into the PIP joint space.  Reading per radiology    Procedures:   Digital Block     PROCEDURE:  Digital Block  LOCATION:  Left second toe.  ANESTHESIA: Digital block using 2 mL marcaine and 2 mL 1% lidocaine, total of 4 mLs.  PROCEDURE NOTE: The patient tolerated the procedure well with good relief of discomfort and there were no complications.  Fracture Reduction     PROCEDURE:  Fracture reduction of the open displaced 2nd left toe proximal phalanx.  ANESTHESIA: Digital block as noted above.  PROCEDURE NOTE: Axial traction and mild flexion was applied to the toe and the displaced fragment of the proximal phalanx was attempted to be reduced however adequate re-alignment of the fracture was not able to be obtained. A wet-to-dry dressing was applied to the wound.  Interventions:  2300 Norco 5-325 mg 1 tablet PO  0031 Augmentin 875-125 mg 1 tablet PO     Emergency Department Course:  Nursing notes and vitals reviewed.  I performed an exam of the patient as documented above.   The patient was sent for a Foot XR, G/E 3 Views, left while in the emergency department, results above.   2332 I  spoke with Dr. Santamaria of the orthopedic surgery service regarding patient's presentation, findings, and plan of care. He recommended consulting podiatry.  2339: I rechecked the patient and updated him on the imaging results. I performed a digital block as noted above.  2348: I spoke with Dr. Echols of the podiatry service from [place] regarding patient's presentation, findings, and plan of care.  2352: I spoke with Dr. Echosl of the podiatry service from [place] regarding patient's presentation, findings, and plan of care.  0030: I rechecked the patient.  0041: I spoke with Dr. Echols of the podiatry service from [place] regarding patient's presentation, findings, and plan of care.  0043: The patient was rechecked and was updated on the plan of care.  I discussed the treatment plan with the patient. They expressed understanding of this plan and consented to admission. I discussed the patient with Dr. Echols, who will take the patient to the operating room for further evaluation and treatment.  I personally reviewed the imaging results with the patient and answered all related questions prior to transferring to the OR.    Impression & Plan      Medical Decision Making:  Patient presents with open fracture with laceration of left 2nd toe with overlying laceration. XR obtained as above with complex fracture of the proximal and middle phalanx with displacement, intraarticular involvement, and extrusion of bone fragment from the wound. Augmentin given for prophylactic infection coverage. Tetanus up to date. Digital block of the toe performed as above. The wound was copiously irrigated with 200ml of sterile saline. An attempt was made at reduction however the fracture fragment was too unstable to achieve adequate reduction. Discussed the case with Dr. Echols of Podiatry who took the patient to the OR for formal exploration, irrigation and fixation of the fracture.      Diagnosis:    ICD-10-CM     1. Open displaced fracture of proximal phalanx of lesser toe of left foot, initial encounter S92.512B      Disposition:   Transfer to OR    Scribe Disclosure:  I, Anjel Burk, am serving as a scribe at 10:51 PM on 10/3/2017 to document services personally performed by Sajan Ortez MD, based on my observations and the provider's statements to me.    10/3/2017    EMERGENCY DEPARTMENT       Sajan Ortez MD  10/04/17 0317

## 2017-10-04 NOTE — ANESTHESIA CARE TRANSFER NOTE
Patient: Chayito Zaman    Procedure(s):  Irrigation and Debridement of Left Second Toe Open Fracture with Pinning - Wound Class: IV-Dirty or Infected   - Wound Class: IV-Dirty or Infected    Diagnosis: Open Left Second Toe Fracture  Diagnosis Additional Information: No value filed.    Anesthesia Type:   MAC     Note:  Airway :Face Mask  Patient transferred to:PACU        Vitals: (Last set prior to Anesthesia Care Transfer)    CRNA VITALS  10/4/2017 0210 - 10/4/2017 0241      10/4/2017             NIBP: 100/56    Pulse: 68    NIBP Mean: 67    SpO2: 99 %    Resp Rate (set): 10                Electronically Signed By: KIP Dhillon CRNA  October 4, 2017  2:41 AM

## 2017-10-05 ENCOUNTER — TELEPHONE (OUTPATIENT)
Dept: PODIATRY | Facility: CLINIC | Age: 22
End: 2017-10-05

## 2017-10-05 DIAGNOSIS — Z98.890 S/P FOOT SURGERY, LEFT: Primary | ICD-10-CM

## 2017-10-05 RX ORDER — OXYCODONE AND ACETAMINOPHEN 5; 325 MG/1; MG/1
TABLET ORAL
Qty: 25 TABLET | Refills: 0 | Status: SHIPPED | OUTPATIENT
Start: 2017-10-05 | End: 2017-10-10

## 2017-10-05 NOTE — TELEPHONE ENCOUNTER
Pt calls.  He had a surgery on his left 2nd toe.  He is having a lot of pain.  The medicine just makes him sleepy, but when he wakes it feels like someone is poking him with a knife.  It is hard for him to even walk on crutches.      He is wondering if there is anything else he can get.      Will forward this to Dr. Echols.      Call on cell phone.

## 2017-10-05 NOTE — TELEPHONE ENCOUNTER
I&D Left 2nd toe fracture with removal of fracture fragment and pinning of digit.     Is patient elevating above heart level?  yes  Is patient icing behind the knee?  no  Any abdominal pain?  no  How is pts pain/how often are they taking pain meds? Every 4-6 hours, 2 of each  Pt informed ace wrap can be loosened to help with pain.  Any questions?  Offered alternate medication that would need to be picked up at the [place] clinic, pt will have [facility]  Rx for him.  Pt has the Triage phone number in case of questions.  Discussed the above with Dr. Echols.

## 2017-10-05 NOTE — TELEPHONE ENCOUNTER
Foot & Ankle Surgery  October 5, 2017    Chayito called back in and I spoke with him.  Rx for Percocet will be left at the [facility] this PM, we just arrived as we were at [place] this AM.  If the Rx for percocet does not help with his pain, I advised he be seen in [place] tomorrow.  We don't have any openings but we can fit him in around 9:45.  I advised he arrive at the [facility] 9:20-9:30 and we will get him in.    All questions answered, patient happy with plan.    Saul Echols DPM   Podiatric Foot & Ankle Surgeon  [facility]

## 2017-10-10 DIAGNOSIS — Z98.890 S/P FOOT SURGERY, LEFT: ICD-10-CM

## 2017-10-10 NOTE — TELEPHONE ENCOUNTER
Patient states that the pain was so bad that he was unable to control it with 1-2 of the oxycodone so he has had to take 3 at times. Patient reminded by this nurse that it was not advisable to increase medications like this.  He only has 5 tabs left and can't get in until Thursday to be seen. Needs refill. Would  at the . Would like to get today. Patient is aware that Dr Echols is in [place] today.   [phone]

## 2017-10-11 RX ORDER — OXYCODONE AND ACETAMINOPHEN 5; 325 MG/1; MG/1
TABLET ORAL
Qty: 25 TABLET | Refills: 0 | Status: SHIPPED | OUTPATIENT
Start: 2017-10-11 | End: 2017-10-18

## 2017-10-11 NOTE — TELEPHONE ENCOUNTER
I called and spoke with Chayito.  Rx forPercocet printed off and left at [facility] main desk for him to  this afternoon.  All questions answered, patient happy with plan.    Saul Echols DPM   Podiatric Foot & Ankle Surgeon  [place]

## 2017-10-11 NOTE — TELEPHONE ENCOUNTER
Patient called back., down to 3 pills, please call ASAP [phone]      Original message routed to [facility], this time I am trying to route to [facility] as well, if I am using the wrong # I apologize

## 2017-10-12 ENCOUNTER — OFFICE VISIT (OUTPATIENT)
Dept: PODIATRY | Facility: CLINIC | Age: 22
End: 2017-10-12
Payer: COMMERCIAL

## 2017-10-12 VITALS
HEART RATE: 107 BPM | BODY MASS INDEX: 20.4 KG/M2 | DIASTOLIC BLOOD PRESSURE: 80 MMHG | WEIGHT: 130 LBS | SYSTOLIC BLOOD PRESSURE: 114 MMHG | HEIGHT: 67 IN

## 2017-10-12 DIAGNOSIS — S92.512G: Primary | ICD-10-CM

## 2017-10-12 PROCEDURE — 99024 POSTOP FOLLOW-UP VISIT: CPT | Performed by: PODIATRIST

## 2017-10-12 NOTE — PROGRESS NOTES
"Foot & Ankle Surgery  October 12, 2017    S:  Patient in today approx 8 days sp I&D Left 2nd toe fracture with removal of fracture fragment and pinning of digit. .  Pain levels elevated.  Wondering when he can go back to work.  Job duties require him being on his feet quite a bit.  Has about 2 days left of Augmentin    /80 (BP Location: Left arm, Patient Position: Sitting, Cuff Size: Adult Regular)  Pulse 107  Ht 5' 7\" (1.702 m)  Wt 130 lb (59 kg)  BMI 20.36 kg/m2      ROS - positive for CC.  Patient denies current nausea, vomiting, chills, fevers, belly pain, calf pain, chest pain or SOB.  Complete remainder of ROS is otherwise neg.    PE - pin site stable.  95% of incision well coapted.  Small area proximally has popped open with some seropurulent drainage.  This was evacuated.  Minimal edema, no erythema, no malodor.  Skin shows no trophic, color or temperature changes otherwise.  No calf redness, swelling or pain noted otherwise.    Imaging - post-op xrays - IMPRESSION: Postoperative changes are seen in the left second toe  including a percutaneous pin and fracture fixation. Middle phalangeal  and proximal phalangeal fractures are in near-anatomic alignment. No  new fractures are seen.    A/P - 22 year old yo patient approx 8 days sp above procedure  -redressed foot  -5 further days of Augmentin prescribed  -keep bandage c/d/i  -minimal ambulation; WB on heel in surgical shoe  -advised aggressive icing, elevation and resting.  Discussed that, while he has to work, if he's on his feet too much, delays in healing are more likely..  This can go from a 3-4 week process to a 3-4 month process. The more he's allowing this to heal, the faster it will do so and the faster he can get back to work  -advised he check on short-term disability with his employer    Follow up  -  Next week in [place] or sooner with acute issues    Plan for [id number] - once healed     The patient is happy with their current post-op " course and to-date operative outcome but concerned about not being able to work.    Body mass index is 20.36 kg/(m^2).          Saul Echols DPM   Podiatric Foot & Ankle Surgeon  [facility]  [phone]

## 2017-10-12 NOTE — NURSING NOTE
"Chief Complaint   Patient presents with     Surgical Followup     I&D Left 2nd toe fracture with removal of fracture fragment and pinning of digit. DOS 10/4/2017 - 8 days ago       Initial /80 (BP Location: Left arm, Patient Position: Sitting, Cuff Size: Adult Regular)  Pulse 107  Ht 5' 7\" (1.702 m)  Wt 130 lb (59 kg)  BMI 20.36 kg/m2 Estimated body mass index is 20.36 kg/(m^2) as calculated from the following:    Height as of this encounter: 5' 7\" (1.702 m).    Weight as of this encounter: 130 lb (59 kg).  Medication Reconciliation: complete    "

## 2017-10-12 NOTE — MR AVS SNAPSHOT
"              After Visit Summary   10/12/2017    Chayito Zaman    MRN: [id number]           Patient Information     Date Of Birth          1995        Visit Information        Provider Department      10/12/2017 10:30 AM Saul Echols DPM [facility]        Today's Diagnoses     Open displaced fracture of proximal phalanx of lesser toe of left foot with delayed healing, subsequent encounter    -  1       Follow-ups after your visit        Who to contact     If you have questions or need follow up information about today's clinic visit or your schedule please contact [facility] directly at [phone].  Normal or non-critical lab and imaging results will be communicated to you by MusicGremlinhart, letter or phone within 4 business days after the clinic has received the results. If you do not hear from us within 7 days, please contact the clinic through MusicGremlinhart or phone. If you have a critical or abnormal lab result, we will notify you by phone as soon as possible.  Submit refill requests through K[employer] or call your pharmacy and they will forward the refill request to us. Please allow 3 business days for your refill to be completed.          Additional Information About Your Visit        MyChart Information     [employer] lets you send messages to your doctor, view your test results, renew your prescriptions, schedule appointments and more. To sign up, go to [email or web address] . Click on \"Log in\" on the left side of the screen, which will take you to the Welcome page. Then click on \"Sign up Now\" on the right side of the page.     You will be asked to enter the access code listed below, as well as some personal information. Please follow the directions to create your username and password.     Your access code is: [email or web address]  Expires: 2018  3:00 AM     Your access code will  in 90 days. If you need help or a new code, please call your [facility] or [phone].      " "  Care EveryWhere ID     This is your Care EveryWhere ID. This could be used by other organizations to access your [place] medical records  [id number]        Your Vitals Were     Pulse Height BMI (Body Mass Index)             107 5' 7\" (1.702 m) 20.36 kg/m2          Blood Pressure from Last 3 Encounters:   10/12/17 114/80   10/04/17 100/64    Weight from Last 3 Encounters:   10/12/17 130 lb (59 kg)   10/03/17 130 lb (59 kg)              Today, you had the following     No orders found for display         Today's Medication Changes          These changes are accurate as of: 10/12/17 11:01 AM.  If you have any questions, ask your nurse or doctor.               These medicines have changed or have updated prescriptions.        Dose/Directions    * amoxicillin-clavulanate 875-125 MG per tablet   Commonly known as:  AUGMENTIN   This may have changed:  Another medication with the same name was added. Make sure you understand how and when to take each.   Used for:  Open displaced fracture of proximal phalanx of lesser toe of left foot, initial encounter, S/P foot surgery, left        Dose:  1 tablet   Take 1 tablet by mouth 2 times daily   Quantity:  20 tablet   Refills:  0       * amoxicillin-clavulanate 875-125 MG per tablet   Commonly known as:  AUGMENTIN   This may have changed:  You were already taking a medication with the same name, and this prescription was added. Make sure you understand how and when to take each.   Used for:  Open displaced fracture of proximal phalanx of lesser toe of left foot with delayed healing, subsequent encounter   Changed by:  Saul Echols DPM        Dose:  1 tablet   Take 1 tablet by mouth 2 times daily for 5 days   Quantity:  10 tablet   Refills:  0       * Notice:  This list has 2 medication(s) that are the same as other medications prescribed for you. Read the directions carefully, and ask your doctor or other care provider to review them with you.         Where to get " your medicines      These medications were sent to [facility], [place] - [address], [address]  [address], [address], [place] [address]     Phone:  [phone]     amoxicillin-clavulanate 875-125 MG per tablet                Primary Care Provider    Physician No Ref-Primary       NO REF-PRIMARY PHYSICIAN        Equal Access to Services     [facility]: Hadii aad ku hadasho Soomaali, waaxda luqadaha, qaybta kaalmada adeegyada, waxay idiin hayaan adekendrick khkristinhector laluis . So [facility] [phone].    ATENCIÓN: Si habla español, tiene a little disposición servicios gratuitos de asistencia lingüística. Desean al [phone].    We comply with applicable federal civil rights laws and [place] laws. We do not discriminate on the basis of race, color, national origin, age, disability, sex, sexual orientation, or gender identity.            Thank you!     Thank you for choosing [facility]  for your care. Our goal is always to provide you with excellent care. Hearing back from our patients is one way we can continue to improve our services. Please take a few minutes to complete the written survey that you may receive in the mail after your visit with us. Thank you!             Your Updated Medication List - Protect others around you: Learn how to safely use, store and throw away your medicines at www.disposemymeds.org.          This list is accurate as of: 10/12/17 11:01 AM.  Always use your most recent med list.                   Brand Name Dispense Instructions for use Diagnosis    * amoxicillin-clavulanate 875-125 MG per tablet    AUGMENTIN    20 tablet    Take 1 tablet by mouth 2 times daily    Open displaced fracture of proximal phalanx of lesser toe of left foot, initial encounter, S/P foot surgery, left       * amoxicillin-clavulanate 875-125 MG per tablet    AUGMENTIN    10 tablet    Take 1 tablet by mouth 2 times daily for 5 days    Open displaced fracture of proximal  phalanx of lesser toe of left foot with delayed healing, subsequent encounter       HYDROcodone-acetaminophen 5-325 MG per tablet    NORCO    40 tablet    Take 1-2 tablets every 4-6 hours as needed for pain.  Do not take other tylenol products with this medication, as too much tylenol can be damaging to the liver.    Open displaced fracture of proximal phalanx of lesser toe of left foot, initial encounter, S/P foot surgery, left       hydrOXYzine 25 MG tablet    ATARAX    40 tablet    Take 1-2 tablets every 4-6 hours as needed for pain control.    S/P foot surgery, left, Open displaced fracture of proximal phalanx of lesser toe of left foot, initial encounter       oxyCODONE-acetaminophen 5-325 MG per tablet    PERCOCET    25 tablet    Take 1-2 tablets every 4-6 hours as needed for pain.  Do not take other tylenol products with this medication, as too much tylenol can be damaging to the liver.    S/P foot surgery, left       senna-docusate 8.6-50 MG per tablet    SENOKOT-S;PERICOLACE    30 tablet    Take 2 tablets daily as needed for constipation while taking prescription pain medications.    S/P foot surgery, left, Open displaced fracture of proximal phalanx of lesser toe of left foot, initial encounter       * Notice:  This list has 2 medication(s) that are the same as other medications prescribed for you. Read the directions carefully, and ask your doctor or other care provider to review them with you.

## 2017-10-16 ENCOUNTER — OFFICE VISIT (OUTPATIENT)
Dept: PODIATRY | Facility: CLINIC | Age: 22
End: 2017-10-16
Payer: COMMERCIAL

## 2017-10-16 VITALS
BODY MASS INDEX: 20.4 KG/M2 | HEIGHT: 67 IN | DIASTOLIC BLOOD PRESSURE: 72 MMHG | WEIGHT: 130 LBS | SYSTOLIC BLOOD PRESSURE: 116 MMHG

## 2017-10-16 DIAGNOSIS — S92.512G: Primary | ICD-10-CM

## 2017-10-16 PROCEDURE — 99024 POSTOP FOLLOW-UP VISIT: CPT | Performed by: PODIATRIST

## 2017-10-16 NOTE — MR AVS SNAPSHOT
"              After Visit Summary   10/16/2017    Chayito Zaman    MRN: [id number]           Patient Information     Date Of Birth          1995        Visit Information        Provider Department      10/16/2017 10:30 AM Saul Echols DPM [facility]        Today's Diagnoses     Open displaced fracture of proximal phalanx of lesser toe of left foot with delayed healing, subsequent encounter    -  1       Follow-ups after your visit        Your next 10 appointments already scheduled     Oct 23, 2017 11:15 AM CDT   Return Visit with Saul Echols DPM   [facility] ([facility])    [address]  [address]  [place] [id number] [phone]              Who to contact     If you have questions or need follow up information about today's clinic visit or your schedule please contact [facility] directly at [phone].  Normal or non-critical lab and imaging results will be communicated to you by MyChart, letter or phone within 4 business days after the clinic has received the results. If you do not hear from us within 7 days, please contact the clinic through Midokurahart or phone. If you have a critical or abnormal lab result, we will notify you by phone as soon as possible.  Submit refill requests through [employer] or call your pharmacy and they will forward the refill request to us. Please allow 3 business days for your refill to be completed.          Additional Information About Your Visit        MyChart Information     [employer] lets you send messages to your doctor, view your test results, renew your prescriptions, schedule appointments and more. To sign up, go to [email or web address] . Click on \"Log in\" on the left side of the screen, which will take you to the Welcome page. Then click on \"Sign up Now\" on the right side of the page.     You will be asked to enter the access code listed below, as well as some personal information. " "Please follow the directions to create your username and password.     Your access code is: [email or web address]  Expires: 2018  3:00 AM     Your access code will  in 90 days. If you need help or a new code, please call your [place] clinic or [phone].        Care EveryWhere ID     This is your Care EveryWhere ID. This could be used by other organizations to access your [place] medical records  [id number]        Your Vitals Were     Height BMI (Body Mass Index)                5' 7\" (1.702 m) 20.36 kg/m2           Blood Pressure from Last 3 Encounters:   10/16/17 116/72   10/12/17 114/80   10/04/17 100/64    Weight from Last 3 Encounters:   10/16/17 130 lb (59 kg)   10/12/17 130 lb (59 kg)   10/03/17 130 lb (59 kg)              Today, you had the following     No orders found for display       Primary Care Provider    Physician No Ref-Primary       NO REF-PRIMARY PHYSICIAN        Equal Access to Services     [facility]: Hadii aad ku hadasho Soomaali, waaxda luqadaha, qaybta kaalmada adeegyada, waxay idiin hayjuven deonte hodgson . So [facility] [phone].    ATENCIÓN: Si habla español, tiene a little disposición servicios gratuitos de asistencia lingüística. Llame al [phone].    We comply with applicable federal civil rights laws and [place] laws. We do not discriminate on the basis of race, color, national origin, age, disability, sex, sexual orientation, or gender identity.            Thank you!     Thank you for choosing [facility]  for your care. Our goal is always to provide you with excellent care. Hearing back from our patients is one way we can continue to improve our services. Please take a few minutes to complete the written survey that you may receive in the mail after your visit with us. Thank you!             Your Updated Medication List - Protect others around you: Learn how to safely use, store and throw away your medicines at www.disposemymeds.org.          This list is " accurate as of: 10/16/17 10:56 AM.  Always use your most recent med list.                   Brand Name Dispense Instructions for use Diagnosis    * amoxicillin-clavulanate 875-125 MG per tablet    AUGMENTIN    20 tablet    Take 1 tablet by mouth 2 times daily    Open displaced fracture of proximal phalanx of lesser toe of left foot, initial encounter, S/P foot surgery, left       * amoxicillin-clavulanate 875-125 MG per tablet    AUGMENTIN    10 tablet    Take 1 tablet by mouth 2 times daily for 5 days    Open displaced fracture of proximal phalanx of lesser toe of left foot with delayed healing, subsequent encounter       HYDROcodone-acetaminophen 5-325 MG per tablet    NORCO    40 tablet    Take 1-2 tablets every 4-6 hours as needed for pain.  Do not take other tylenol products with this medication, as too much tylenol can be damaging to the liver.    Open displaced fracture of proximal phalanx of lesser toe of left foot, initial encounter, S/P foot surgery, left       hydrOXYzine 25 MG tablet    ATARAX    40 tablet    Take 1-2 tablets every 4-6 hours as needed for pain control.    S/P foot surgery, left, Open displaced fracture of proximal phalanx of lesser toe of left foot, initial encounter       oxyCODONE-acetaminophen 5-325 MG per tablet    PERCOCET    25 tablet    Take 1-2 tablets every 4-6 hours as needed for pain.  Do not take other tylenol products with this medication, as too much tylenol can be damaging to the liver.    S/P foot surgery, left       senna-docusate 8.6-50 MG per tablet    SENOKOT-S;PERICOLACE    30 tablet    Take 2 tablets daily as needed for constipation while taking prescription pain medications.    S/P foot surgery, left, Open displaced fracture of proximal phalanx of lesser toe of left foot, initial encounter       * Notice:  This list has 2 medication(s) that are the same as other medications prescribed for you. Read the directions carefully, and ask your doctor or other care  provider to review them with you.

## 2017-10-16 NOTE — PROGRESS NOTES
"Foot & Ankle Surgery  October 16, 2017    S:  Patient in today approx 12 days sp I&D with pinning L 2nd toe.  Pain levels much improved.  Taking PO abx currently.  He bumped the pin, which is slightly rotated, but not loose.  He has not checked into short-term disability from his employer yet.    /72  Ht 5' 7\" (1.702 m)  Wt 130 lb (59 kg)  BMI 20.36 kg/m2      ROS - positive for CC.  Patient denies current nausea, vomiting, chills, fevers, belly pain, calf pain, chest pain or SOB.  Complete remainder of ROS is otherwise neg.    PE - incisions healing well, no drainage.  Proximal open area from last visit is full granular, no drainage or SOI.  Pin is not loose but medially rotated.  Skin shows no trophic, color or temperature changes otherwise.  No calf redness, swelling or pain noted otherwise.    A/P - 22 year old yo patient approx 12 days sp above procedure  -redressed foot with bacitracin to open, granulated area  -finish PO abx course  -continue heel WB in surgical shoe  -if pin loosens, advised not to push back into toe  -discussed probable suture removal next visit.  Discussed typical 6-week course of pin, but consider removal  -advised he check on short-term disability with employer    Follow up  -  1 week or sooner with acute issues    Plan for [id number] - after pin removal     The patient is happy with their current post-op course and to-date operative outcome.    Body mass index is 20.36 kg/(m^2).          Saul Echols DPM   Podiatric Foot & Ankle Surgeon  [facility]  [phone]  "

## 2017-10-16 NOTE — NURSING NOTE
"Chief Complaint   Patient presents with     Surgical Followup     L 2nd toe       Initial /72  Ht 5' 7\" (1.702 m)  Wt 130 lb (59 kg)  BMI 20.36 kg/m2 Estimated body mass index is 20.36 kg/(m^2) as calculated from the following:    Height as of this encounter: 5' 7\" (1.702 m).    Weight as of this encounter: 130 lb (59 kg).  Medication Reconciliation: complete  "

## 2017-10-18 DIAGNOSIS — Z98.890 S/P FOOT SURGERY, LEFT: ICD-10-CM

## 2017-10-18 NOTE — TELEPHONE ENCOUNTER
oxyCODONE-acetaminophen (PERCOCET) 5-325 MG per tablet      Last Written Prescription Date:  10/11/17  Last Fill Quantity: 25,   # refills: 0  Future Office visit:    Next 5 appointments (look out 90 days)     Oct 23, 2017 11:15 AM CDT   Return Visit with Saul Echols DPM   [facility]    [address] [place] [id number] [phone]                   Routing refill request to provider for review/approval because:  Drug not on the [facility], [facility] or [facility] refill protocol or controlled substance

## 2017-10-19 RX ORDER — OXYCODONE AND ACETAMINOPHEN 5; 325 MG/1; MG/1
TABLET ORAL
Qty: 25 TABLET | Refills: 0 | Status: SHIPPED | OUTPATIENT
Start: 2017-10-19 | End: 2017-10-23

## 2017-10-19 NOTE — TELEPHONE ENCOUNTER
Rx refilled.  I called and notified Chayito, he will  from [place]  this AM.    JUDY ManriquezM   Podiatric Foot & Ankle Surgeon  [facility]

## 2017-10-23 ENCOUNTER — OFFICE VISIT (OUTPATIENT)
Dept: PODIATRY | Facility: CLINIC | Age: 22
End: 2017-10-23
Payer: COMMERCIAL

## 2017-10-23 VITALS
DIASTOLIC BLOOD PRESSURE: 70 MMHG | WEIGHT: 131 LBS | BODY MASS INDEX: 20.56 KG/M2 | SYSTOLIC BLOOD PRESSURE: 112 MMHG | HEIGHT: 67 IN

## 2017-10-23 DIAGNOSIS — Z98.890 S/P FOOT SURGERY, LEFT: Primary | ICD-10-CM

## 2017-10-23 PROCEDURE — 99024 POSTOP FOLLOW-UP VISIT: CPT | Performed by: PODIATRIST

## 2017-10-23 NOTE — NURSING NOTE
"Chief Complaint   Patient presents with     Surgical Followup     10/4/17 L 2nd toe       Initial /70  Ht 5' 7\" (1.702 m)  Wt 131 lb (59.4 kg)  BMI 20.52 kg/m2 Estimated body mass index is 20.52 kg/(m^2) as calculated from the following:    Height as of this encounter: 5' 7\" (1.702 m).    Weight as of this encounter: 131 lb (59.4 kg).  Medication Reconciliation: complete  "

## 2017-10-23 NOTE — PROGRESS NOTES
"Foot & Ankle Surgery  October 23, 2017    S:  Patient in today approx 19 days sp I&D open toe fracture with pinning.  Pain levels much improved.  He wants the pin out and to return to work.  He's wearing a sandal today on the left foot.      /70  Ht 5' 7\" (1.702 m)  Wt 131 lb (59.4 kg)  BMI 20.52 kg/m2      ROS - positive for CC.  Patient denies current nausea, vomiting, chills, fevers, belly pain, calf pain, chest pain or SOB.  Complete remainder of ROS is otherwise neg.    PE - small eschar at area of previous opening.  Otherwise incision healing nicely.  Pin site stable without SOI, but pin is loose.  Skin shows no trophic, color or temperature changes otherwise.  No calf redness, swelling or pain noted otherwise.    A/P - 22 year old yo patient approx 3 weeks sp above procedure  -sutures out, pin removed without issues  -no obvious instability to toe  -wash/dry daily, apply bacitracin and bandaid to pin site and to small eschar  -gradual return to shoe gear as tolerated  -slow gradual return to activities to tolerance  -not to return to work 10/26/17; call Wednesday afternoon if his toe does not feel ready to return to work duties  -discussed possible instability of toe with removing pin early; however, would likely see instability in toe even if pin were left in place x 6 weeks, as the portion of bone had to be removed, and the pin was quite loose today.     Follow up  -  1 week or sooner with acute issues    Plan for [id number] - 10/26/17     The patient is very happy with their current post-op course and to-date operative outcome.    Body mass index is 20.52 kg/(m^2).          Saul Echols DPM   Podiatric Foot & Ankle Surgeon  [facility]  [phone]  "

## 2017-10-23 NOTE — LETTER
[address]  [address]  [facility] [address] [phone]          October 23, 2017    RE:  Chayito Zaman                                                                                                                                                       [address]  [facility] [address]            To whom it may concern:    Chayito Zaman is under my professional care for open toe fracture 3 weeks out from surgery.  He can return to work on 10/26/17.    Thank you        JUDY ManriquezM   Podiatric Foot & Ankle Surgeon  [facility]

## 2017-10-23 NOTE — MR AVS SNAPSHOT
"              After Visit Summary   10/23/2017    Chayito Zaman    MRN: [id number]           Patient Information     Date Of Birth          1995        Visit Information        Provider Department      10/23/2017 11:15 AM Saul Echols DPM [facility]         Follow-ups after your visit        Your next 10 appointments already scheduled     Oct 30, 2017 11:15 AM CDT   Return Visit with Saul Echols DPM   [facility] ([facility])    [address]  [address]  [facility] [address] [phone]              Who to contact     If you have questions or need follow up information about today's clinic visit or your schedule please contact [facility] directly at [phone].  Normal or non-critical lab and imaging results will be communicated to you by MyChart, letter or phone within 4 business days after the clinic has received the results. If you do not hear from us within 7 days, please contact the clinic through MyChart or phone. If you have a critical or abnormal lab result, we will notify you by phone as soon as possible.  Submit refill requests through [employer] or call your pharmacy and they will forward the refill request to us. Please allow 3 business days for your refill to be completed.          Additional Information About Your Visit        [employer] Information     [employer] lets you send messages to your doctor, view your test results, renew your prescriptions, schedule appointments and more. To sign up, go to [email or web address] . Click on \"Log in\" on the left side of the screen, which will take you to the Welcome page. Then click on \"Sign up Now\" on the right side of the page.     You will be asked to enter the access code listed below, as well as some personal information. Please follow the directions to create your username and password.     Your access code is: [email or web address]  Expires: 1/2/2018  3:00 AM     Your access " "code will  in 90 days. If you need help or a new code, please call your [place] clinic or [phone].        Care EveryWhere ID     This is your Care EveryWhere ID. This could be used by other organizations to access your [place] medical records  [id number]        Your Vitals Were     Height BMI (Body Mass Index)                5' 7\" (1.702 m) 20.52 kg/m2           Blood Pressure from Last 3 Encounters:   10/23/17 112/70   10/16/17 116/72   10/12/17 114/80    Weight from Last 3 Encounters:   10/23/17 131 lb (59.4 kg)   10/16/17 130 lb (59 kg)   10/12/17 130 lb (59 kg)              Today, you had the following     No orders found for display         Today's Medication Changes          These changes are accurate as of: 10/23/17 11:49 AM.  If you have any questions, ask your nurse or doctor.               Stop taking these medicines if you haven't already. Please contact your care team if you have questions.     amoxicillin-clavulanate 875-125 MG per tablet   Commonly known as:  AUGMENTIN   Stopped by:  Saul Echols DPM           HYDROcodone-acetaminophen 5-325 MG per tablet   Commonly known as:  NORCO   Stopped by:  Saul Echols DPM           hydrOXYzine 25 MG tablet   Commonly known as:  ATARAX   Stopped by:  Saul Echols DPM           oxyCODONE-acetaminophen 5-325 MG per tablet   Commonly known as:  PERCOCET   Stopped by:  Saul Echols DPM           senna-docusate 8.6-50 MG per tablet   Commonly known as:  SENOKOT-S;PERICOLACE   Stopped by:  Saul Echols DPM                    Primary Care Provider    Physician No Ref-Primary       NO REF-PRIMARY PHYSICIAN        Equal Access to Services     [facility] : Shefali Blancas, wakaylada lurobert, qaybta kaalmada galeda, ree page. [facility] [phone].    ATENCIÓN: Si habla español, tiene a little disposición servicios gratuitos de asistencia lingüística. Llame al " [phone].    We comply with applicable federal civil rights laws and [place] laws. We do not discriminate on the basis of race, color, national origin, age, disability, sex, sexual orientation, or gender identity.            Thank you!     Thank you for choosing [facility]  for your care. Our goal is always to provide you with excellent care. Hearing back from our patients is one way we can continue to improve our services. Please take a few minutes to complete the written survey that you may receive in the mail after your visit with us. Thank you!             Your Updated Medication List - Protect others around you: Learn how to safely use, store and throw away your medicines at www.disposemymeds.org.      Notice  As of 10/23/2017 11:49 AM    You have not been prescribed any medications.

## 2017-11-15 ENCOUNTER — TELEPHONE (OUTPATIENT)
Dept: PODIATRY | Facility: CLINIC | Age: 22
End: 2017-11-15

## 2017-11-15 NOTE — TELEPHONE ENCOUNTER
Reason for Call:  Medication or medication refill:    Do you use a [facility]?  Name of the pharmacy and phone number for the current request:  WRITTEN PRESCRIPTION REQUESTED    Name of the medication requested: oxyCODONE-acetaminophen (PERCOCET) 5-325 MG per tablet        Other request: Wants to  at  clinic     Can we leave a detailed message on this number? YES    Phone number patient can be reached at: Cell number on file:    Telephone Information:   Mobile [phone]       Best Time: anytime     Call taken on 11/15/2017 at 8:58 AM by Key Thornton

## 2018-01-23 ENCOUNTER — HOSPITAL ENCOUNTER (EMERGENCY)
Facility: CLINIC | Age: 23
Discharge: HOME | End: 2018-01-23
Attending: EMERGENCY MEDICINE | Admitting: EMERGENCY MEDICINE
Payer: COMMERCIAL

## 2018-01-23 VITALS
WEIGHT: 141 LBS | SYSTOLIC BLOOD PRESSURE: 112 MMHG | HEIGHT: 67 IN | TEMPERATURE: 100 F | OXYGEN SATURATION: 99 % | RESPIRATION RATE: 12 BRPM | HEART RATE: 100 BPM | BODY MASS INDEX: 22.13 KG/M2 | DIASTOLIC BLOOD PRESSURE: 82 MMHG

## 2018-01-23 DIAGNOSIS — J11.1 INFLUENZA-LIKE ILLNESS: ICD-10-CM

## 2018-01-23 LAB
DEPRECATED S PYO AG THROAT QL EIA: NORMAL
FLUAV+FLUBV AG SPEC QL: NEGATIVE
FLUAV+FLUBV AG SPEC QL: NEGATIVE
SPECIMEN SOURCE: NORMAL
SPECIMEN SOURCE: NORMAL

## 2018-01-23 PROCEDURE — 87081 CULTURE SCREEN ONLY: CPT | Performed by: PHYSICIAN ASSISTANT

## 2018-01-23 PROCEDURE — 25000132 ZZH RX MED GY IP 250 OP 250 PS 637: Performed by: EMERGENCY MEDICINE

## 2018-01-23 PROCEDURE — 87804 INFLUENZA ASSAY W/OPTIC: CPT | Performed by: PHYSICIAN ASSISTANT

## 2018-01-23 PROCEDURE — 87880 STREP A ASSAY W/OPTIC: CPT | Performed by: PHYSICIAN ASSISTANT

## 2018-01-23 PROCEDURE — 99283 EMERGENCY DEPT VISIT LOW MDM: CPT

## 2018-01-23 PROCEDURE — 25000132 ZZH RX MED GY IP 250 OP 250 PS 637: Performed by: PHYSICIAN ASSISTANT

## 2018-01-23 RX ORDER — IBUPROFEN 600 MG/1
600 TABLET, FILM COATED ORAL ONCE
Status: COMPLETED | OUTPATIENT
Start: 2018-01-23 | End: 2018-01-23

## 2018-01-23 RX ORDER — ACETAMINOPHEN 500 MG
1000 TABLET ORAL ONCE
Status: COMPLETED | OUTPATIENT
Start: 2018-01-23 | End: 2018-01-23

## 2018-01-23 RX ADMIN — ACETAMINOPHEN 1000 MG: 500 TABLET, FILM COATED ORAL at 19:23

## 2018-01-23 RX ADMIN — IBUPROFEN 600 MG: 600 TABLET ORAL at 21:57

## 2018-01-23 ASSESSMENT — ENCOUNTER SYMPTOMS
FEVER: 1
SHORTNESS OF BREATH: 0
SORE THROAT: 1
DIARRHEA: 0
MYALGIAS: 1
NAUSEA: 0
CHILLS: 1
HEADACHES: 1
RHINORRHEA: 0
COUGH: 0

## 2018-01-23 NOTE — ED AVS SNAPSHOT
Emergency Department    [address] [place] [address]    Phone:  [phone]    Fax:  [phone]                                       Chayito Zaman   MRN: [id number]    Department:   Emergency Department   Date of Visit:  1/23/2018           After Visit Summary Signature Page     I have received my discharge instructions, and my questions have been answered. I have discussed any challenges I see with this plan with the nurse or doctor.    ..........................................................................................................................................  Patient/Patient Representative Signature      ..........................................................................................................................................  Patient Representative Print Name and Relationship to Patient    ..................................................               ................................................  Date                                            Time    ..........................................................................................................................................  Reviewed by Signature/Title    ...................................................              ..............................................  Date                                                            Time

## 2018-01-23 NOTE — ED AVS SNAPSHOT
Emergency Department    [address] [facility] [address]    Phone:  [phone]    Fax:  [phone]                                       Chayito Zaman   MRN: [id number]    Department:   Emergency Department   Date of Visit:  1/23/2018           Patient Information     Date Of Birth          1995        Your diagnoses for this visit were:     Influenza-like illness        You were seen by Pedro Rowan MD.      Follow-up Information     Follow up with Josh Navarro MD.    Specialty:  Internal Medicine    Why:  As needed    Contact information:    [address] [facility] [address] [phone]          Discharge Instructions       Discharge Instructions  Influenza    You were diagnosed today with influenza or influenza like illness.  Influenza is a respiratory (breathing) illness caused by influenza A or B viruses.  Influenza causes five primary symptoms: fever, headache, muscle aches/fatigue/malaise, sore throat and cough.  These symptoms start one to four days after you have been around a person with this illness. Influenza is spread through sneezing and coughing and can live on surfaces for several days.  It is usually contagious for 5 days but in some cases up to 10 days and often affects several family members. If you have a family member who is less than 2 years old, older than 65 years old, pregnant or has a serious medical condition, they should be seen right away by a provider to decide if they should take preventative medications. Although influenza will make you feel very ill, most patients don t require any specific treatment. An antiviral medication might be prescribed for certain groups of patients (older patients, younger patients, and those with certain chronic medical problems).    Generally, every Emergency Department visit should have a follow-up clinic visit with either a primary or a specialty clinic/provider. Please follow-up as instructed by  your emergency provider today.    Return to the Emergency Department if:    You have trouble breathing.    You develop pain in your chest.    You have signs of being dehydrated, such as dizziness or unable to urinate (pee) at least three times daily.    You are confused or severely weak.    You cannot stop vomiting (throwing up) or you cannot drink enough fluids.    In children, you should seek help if the child has any of the above or if child:    Has blue or purplish skin color.    Is so irritable that he or she does not want to be held.    Does not have tears when crying (in infants) or does not urinate at least three times daily.    Does not wake up easily.    What can I do to help myself?    Rest.    Fluids -- Drink hydrating solutions such as Gatorade  or Pedialyte  as often as you can. If you are drinking enough, you should pass urine at least every eight hours.    Tylenol  (acetaminophen) and Advil  (ibuprofen) can relieve fever, headache, and muscle aches. Do not give aspirin to children under 18 years old.     Antiviral treatment -- Antiviral medicines do not make the flu symptoms go away immediately.  They have only been shown to make the symptoms go away 12 to 24 hours sooner than they would without treatment.       Antibiotics -- Antibiotics are NOT useful for treating viral illnesses such as influenza. Antibiotics should only be used if there is a bacterial complication of the flu such as bacterial pneumonia, ear infection, or sinusitis.    Because you were diagnosed with a flu like illness you are very contagious.  This means you cannot work, attend school or  for at least 24 hours or until you no longer have a fever.  If you were given a prescription for medicine here today, be sure to read all of the information (including the package insert) that comes with your prescription.  This will include important information about the medicine, its side effects, and any warnings that you need to know  about.  The pharmacist who fills the prescription can provide more information and answer questions you may have about the medicine.  If you have questions or concerns that the pharmacist cannot address, please call or return to the Emergency Department.   Remember that you can always come back to the Emergency Department if you are not able to see your regular provider in the amount of time listed above, if you get any new symptoms, or if there is anything that worries you.    24 Hour Appointment Hotline       To make an appointment at any [facility], call [phone] ([phone]). If you don't have a family doctor or clinic, we will help you find one. [facility] are conveniently located to serve the needs of you and your family.             Review of your medicines      Notice     You have not been prescribed any medications.            Procedures and tests performed during your visit     Beta strep group A culture    Influenza A/B antigen    Rapid strep screen      Orders Needing Specimen Collection     None      Pending Results     Date and Time Order Name Status Description    1/23/2018 2041 Beta strep group A culture In process             Pending Culture Results     Date and Time Order Name Status Description    1/23/2018 2041 Beta strep group A culture In process             Pending Results Instructions     If you had any lab results that were not finalized at the time of your Discharge, you can call the ED Lab Result RN at [phone]. You will be contacted by this team for any positive Lab results or changes in treatment. The nurses are available 7 days a week from 10A to 6:30P.  You can leave a message 24 hours per day and they will return your call.        Test Results From Your Hospital Stay        1/23/2018  9:11 PM      Component Results     Component Value Ref Range & Units Status    Influenza A/B Agn Specimen Nasal  Final    Influenza A Negative NEG^Negative Final    Influenza B  Negative NEG^Negative Final    Test results must be correlated with clinical data. If necessary, results   should be confirmed by a molecular assay or viral culture.           1/23/2018  8:59 PM      Component Results     Component    Specimen Description    Throat    Rapid Strep A Screen    NEGATIVE: No Group A streptococcal antigen detected by immunoassay, await culture report.         1/23/2018  9:00 PM                Clinical Quality Measure: Blood Pressure Screening     Your blood pressure was checked while you were in the emergency department today. The last reading we obtained was  BP: 112/82 . Please read the guidelines below about what these numbers mean and what you should do about them.  If your systolic blood pressure (the top number) is less than 120 and your diastolic blood pressure (the bottom number) is less than 80, then your blood pressure is normal. There is nothing more that you need to do about it.  If your systolic blood pressure (the top number) is 120-139 or your diastolic blood pressure (the bottom number) is 80-89, your blood pressure may be higher than it should be. You should have your blood pressure rechecked within a year by a primary care provider.  If your systolic blood pressure (the top number) is 140 or greater or your diastolic blood pressure (the bottom number) is 90 or greater, you may have high blood pressure. High blood pressure is treatable, but if left untreated over time it can put you at risk for heart attack, stroke, or kidney failure. You should have your blood pressure rechecked by a primary care provider within the next 4 weeks.  If your provider in the emergency department today gave you specific instructions to follow-up with your doctor or provider even sooner than that, you should follow that instruction and not wait for up to 4 weeks for your follow-up visit.        Thank you for choosing [facility]       Thank you for choosing [facility] for your care. Our goal is  "always to provide you with excellent care. Hearing back from our patients is one way we can continue to improve our services. Please take a few minutes to complete the written survey that you may receive in the mail after you visit with us. Thank you!        [employer] Information     [employer] lets you send messages to your doctor, view your test results, renew your prescriptions, schedule appointments and more. To sign up, go to [email or web address] . Click on \"Log in\" on the left side of the screen, which will take you to the Welcome page. Then click on \"Sign up Now\" on the right side of the page.     You will be asked to enter the access code listed below, as well as some personal information. Please follow the directions to create your username and password.     Your access code is: [email or web address]  Expires: 2018 10:08 PM     Your access code will  in 90 days. If you need help or a new code, please call your [place] clinic or [phone].        Care EveryWhere ID     This is your Care EveryWhere ID. This could be used by other organizations to access your [place] medical records  [id number]        Equal Access to Services     IVONNE VAUGHAN : Shefali Blancas, wahilda patrick, qakwesi andino, ree page. [facility] [phone].    ATENCIÓN: Si habla español, tiene a little disposición servicios gratuitos de asistencia lingüística. Desean al [phone].    We comply with applicable federal civil rights laws and [place] laws. We do not discriminate on the basis of race, color, national origin, age, disability, sex, sexual orientation, or gender identity.            After Visit Summary       This is your record. Keep this with you and show to your community pharmacist(s) and doctor(s) at your next visit.                  "

## 2018-01-24 NOTE — ED PROVIDER NOTES
"  History     Chief Complaint:  Otalgia, headache, body aches, chills, nausea    HPI   Chayito Zaman is a 22 year old male who presents to the emergency department today for evaluation of flu-like symptoms. He noted that he began to feel ill on Sunday following the football game. He said he has been achy since then and now notes bilateral ear pain and throat pain when he swallows. He has been febrile and has not taken any Ibuprofen or Tylenol. Patient denies cough or runny nose. He has been around other ill people. Denies getting flu shot this year.     Allergies:  No Known Drug Allergies    Medications:    The patient is currently on no regular medications.    Past Medical History:    Hyperglycemia    Past Surgical History:    IRRIGATION AND DEBRIDEMENT TOE, COMBINED, Left  OPEN REDUCTION INTERNAL FIXATION TOE(S)    Family History:    History reviewed. No pertinent family history.     Social History:  The patient was accompanied to the ED by self.  Smoking Status: Current every day smoker  Smokeless Tobacco: Never  Alcohol Use: Yes  Marital Status:  Single     Review of Systems   Constitutional: Positive for chills and fever.   HENT: Positive for ear pain and sore throat. Negative for rhinorrhea.    Respiratory: Negative for cough and shortness of breath.    Gastrointestinal: Negative for diarrhea and nausea.   Musculoskeletal: Positive for myalgias.   Neurological: Positive for headaches.   All other systems reviewed and are negative.    Physical Exam   Vitals:   Patient Vitals for the past 24 hrs:   BP Temp Temp src Pulse Heart Rate Resp SpO2 Height Weight   01/23/18 2042 110/78 100  F (37.8  C) Oral 98 - - 98 % - -   01/23/18 1917 118/66 103.2  F (39.6  C) Oral - 105 18 100 % 1.702 m (5' 7\") 64 kg (141 lb)     Physical Exam  General: Alert and interactive.   Eyes: The pupils are equal, round, and reactive to light. EOMs intact. No scleral icterus.   ENT:      Nose: No obvious sign of deformity. Nares are " patent.   Ears: No erythema or swelling to the exterior ear. TMs are non-erythematous and are not bulging. No signs of infection.   Mouth/Throat: No erythema of posterior oropharynx. No obvious exudate.   Mucus membranes are moist.    Neck: Normal range of motion. No nuchal rigidity.Trachea is in the midline       CV: Regular rate and rhythm. S1 and S2 normal without murmur, click, gallop or rub.   Resp: Breath sounds are clear bilaterally, without rhonchi, wheezes, rales. Non-labored, no retractions or accessory muscle use.     GI: Abdomen is soft without distension. No tenderness to palpation. No peritoneal signs.    Skin:  Warm and dry. No rash or lesions noted.  Neuro:  Alert and oriented x 3. GCS 15.   Psych: Awake. Alert.  Normal affect. Appropriate interactions.  Lymph: No anterior or posterior cervical lymphadenopathy noted.    Emergency Department Course     Laboratory:  Laboratory findings were communicated with the patient who voiced understanding of the findings.  Influenza: Negative for influenza A/B  Strep: Negative     Interventions:  1923 Tylenol 1000 mg PO  2157 Ibuprofen 600 mg PO    Emergency Department Course:  Nursing notes and vitals reviewed.  2030: I performed an exam of the patient as documented above.   The above labs tests were obtained.   I personally reviewed the laboratory  results with the patient and answered all related questions prior to discharge.    Impression & Plan    Medical Decision Making:  Chayito Zaman is a 22 year old male who presents for evaluation of otalgia, fever, headache, and myalgias.  This is consistent with an influenza-like illness. He did not get the flu shot this year. Influenza and strep swab were negative today, but the patient understands the sensitivity of the influenza rapid test. He is at risk for pneumonia but no signs of this are detected on today's visit.  Close followup of primary care physician is indicated, and patient should return to the ED  for high fevers > 103 for more than 48 hours more, increasing productive cough, shortness of breath, or confusion. There are currently no signs of serious bacterial infection such as bacteremia, meningitis, UTI/pyelonephritis, strep pharyngitis. I feel as if he is safe for discharge.    Diagnosis:    ICD-10-CM    1. Influenza-like illness R69        Disposition: Discharged to home    Discharge Medications:    I, Betty Toribio PA-C, interviewed the patient, explained the course of action and discussed the patient with Dr. Rowan, who then evaluated the patient.    Betty Toribio PA-C  1/23/2018    EMERGENCY DEPARTMENT       Betty Toribio PA-C  01/23/18 2206

## 2018-01-24 NOTE — ED NOTES
RN in room with discharge paperwork, pt not in room. ED MD notified that pt left without receiving his discharge paperwork.

## 2018-01-24 NOTE — ED NOTES
Pt presents to ED with c/o bi lateral ear pain, body aches, nausea, fever and throat pain since Sunday. Pt reports he has been around a friend recently who has been sick. Pt denies any cough. Pt in NAD. Will continue to monitor. Call light in reach.

## 2018-01-24 NOTE — DISCHARGE INSTRUCTIONS
Discharge Instructions  Influenza    You were diagnosed today with influenza or influenza like illness.  Influenza is a respiratory (breathing) illness caused by influenza A or B viruses.  Influenza causes five primary symptoms: fever, headache, muscle aches/fatigue/malaise, sore throat and cough.  These symptoms start one to four days after you have been around a person with this illness. Influenza is spread through sneezing and coughing and can live on surfaces for several days.  It is usually contagious for 5 days but in some cases up to 10 days and often affects several family members. If you have a family member who is less than 2 years old, older than 65 years old, pregnant or has a serious medical condition, they should be seen right away by a provider to decide if they should take preventative medications. Although influenza will make you feel very ill, most patients don t require any specific treatment. An antiviral medication might be prescribed for certain groups of patients (older patients, younger patients, and those with certain chronic medical problems).    Generally, every Emergency Department visit should have a follow-up clinic visit with either a primary or a specialty clinic/provider. Please follow-up as instructed by your emergency provider today.    Return to the Emergency Department if:    You have trouble breathing.    You develop pain in your chest.    You have signs of being dehydrated, such as dizziness or unable to urinate (pee) at least three times daily.    You are confused or severely weak.    You cannot stop vomiting (throwing up) or you cannot drink enough fluids.    In children, you should seek help if the child has any of the above or if child:    Has blue or purplish skin color.    Is so irritable that he or she does not want to be held.    Does not have tears when crying (in infants) or does not urinate at least three times daily.    Does not wake up easily.    What can I do to  help myself?    Rest.    Fluids -- Drink hydrating solutions such as Gatorade  or Pedialyte  as often as you can. If you are drinking enough, you should pass urine at least every eight hours.    Tylenol  (acetaminophen) and Advil  (ibuprofen) can relieve fever, headache, and muscle aches. Do not give aspirin to children under 18 years old.     Antiviral treatment -- Antiviral medicines do not make the flu symptoms go away immediately.  They have only been shown to make the symptoms go away 12 to 24 hours sooner than they would without treatment.       Antibiotics -- Antibiotics are NOT useful for treating viral illnesses such as influenza. Antibiotics should only be used if there is a bacterial complication of the flu such as bacterial pneumonia, ear infection, or sinusitis.    Because you were diagnosed with a flu like illness you are very contagious.  This means you cannot work, attend school or  for at least 24 hours or until you no longer have a fever.  If you were given a prescription for medicine here today, be sure to read all of the information (including the package insert) that comes with your prescription.  This will include important information about the medicine, its side effects, and any warnings that you need to know about.  The pharmacist who fills the prescription can provide more information and answer questions you may have about the medicine.  If you have questions or concerns that the pharmacist cannot address, please call or return to the Emergency Department.   Remember that you can always come back to the Emergency Department if you are not able to see your regular provider in the amount of time listed above, if you get any new symptoms, or if there is anything that worries you.

## 2018-01-25 LAB
BACTERIA SPEC CULT: NORMAL
Lab: NORMAL
SPECIMEN SOURCE: NORMAL

## 2021-08-09 ENCOUNTER — HOSPITAL ENCOUNTER (EMERGENCY)
Facility: CLINIC | Age: 26
Discharge: LEFT BEFORE DISCHARGE | End: 2021-08-09
Payer: COMMERCIAL

## 2021-08-09 VITALS
HEART RATE: 53 BPM | DIASTOLIC BLOOD PRESSURE: 114 MMHG | OXYGEN SATURATION: 95 % | TEMPERATURE: 98.9 F | RESPIRATION RATE: 14 BRPM | BODY MASS INDEX: 21.66 KG/M2 | HEIGHT: 67 IN | WEIGHT: 138 LBS | SYSTOLIC BLOOD PRESSURE: 136 MMHG

## 2021-08-09 RX ORDER — AMOXICILLIN 500 MG/1
500 CAPSULE ORAL 3 TIMES DAILY
COMMUNITY
Start: 2021-08-08

## 2021-08-09 ASSESSMENT — MIFFLIN-ST. JEOR: SCORE: 1559.59

## 2021-08-09 NOTE — ED TRIAGE NOTES
Pt presents with c/o right lower dental pain x 1 day.  Was seen overnight at [facility]; given oxycodone and antibiotics, which have not been helpful.  Denies any fever or drainage.

## 2024-10-23 ENCOUNTER — HOSPITAL ENCOUNTER (OUTPATIENT)
Dept: BEHAVIORAL HEALTH | Facility: CLINIC | Age: 29
Discharge: HOME | End: 2024-10-23
Attending: PSYCHIATRY & NEUROLOGY | Admitting: PSYCHIATRY & NEUROLOGY
Payer: COMMERCIAL

## 2024-10-23 VITALS — BODY MASS INDEX: 23.86 KG/M2 | HEIGHT: 67 IN | WEIGHT: 152 LBS

## 2024-10-23 DIAGNOSIS — F17.200 TOBACCO USE DISORDER, MODERATE, DEPENDENCE: ICD-10-CM

## 2024-10-23 DIAGNOSIS — F10.10 ALCOHOL USE DISORDER, MILD, ABUSE: Primary | ICD-10-CM

## 2024-10-23 PROCEDURE — H0001 ALCOHOL AND/OR DRUG ASSESS: HCPCS

## 2024-10-23 ASSESSMENT — COLUMBIA-SUICIDE SEVERITY RATING SCALE - C-SSRS
1. HAVE YOU WISHED YOU WERE DEAD OR WISHED YOU COULD GO TO SLEEP AND NOT WAKE UP?: NO
TOTAL  NUMBER OF ABORTED OR SELF INTERRUPTED ATTEMPTS LIFETIME: NO
ATTEMPT LIFETIME: NO
6. HAVE YOU EVER DONE ANYTHING, STARTED TO DO ANYTHING, OR PREPARED TO DO ANYTHING TO END YOUR LIFE?: NO
2. HAVE YOU ACTUALLY HAD ANY THOUGHTS OF KILLING YOURSELF?: NO
TOTAL  NUMBER OF INTERRUPTED ATTEMPTS LIFETIME: NO

## 2024-10-23 ASSESSMENT — PATIENT HEALTH QUESTIONNAIRE - PHQ9
10. IF YOU CHECKED OFF ANY PROBLEMS, HOW DIFFICULT HAVE THESE PROBLEMS MADE IT FOR YOU TO DO YOUR WORK, TAKE CARE OF THINGS AT HOME, OR GET ALONG WITH OTHER PEOPLE: NOT DIFFICULT AT ALL
SUM OF ALL RESPONSES TO PHQ QUESTIONS 1-9: 1
SUM OF ALL RESPONSES TO PHQ QUESTIONS 1-9: 1

## 2024-10-23 ASSESSMENT — PAIN SCALES - GENERAL: PAINLEVEL_OUTOF10: NO PAIN (0)

## 2024-10-23 NOTE — PROGRESS NOTES
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 of personal safety concerns.    The patient denied a history of assaultive behaviors.    The patient denied having any history of sexual assault behaviors.  The patient denied having any history of being registered as a sex offender.  The patient reported a history of unsafe motor vehicle operation and reported a history of having legal consequences associated with substance use.  The patient denies any history of high risk behaviors associated with mental health symptoms.  The patient reported the following protective factors: positive relationships positive family connections, forward/future oriented thinking, dedication to family/friends, safe and stable environment, regular physical activity, living with other people, daily obligations, and commitment to well-being.    Risk Plan:  See Recommendations for Safety and Risk Management Plan    Review of Symptoms per patient report:  Substance Use:  substance related legal problems and driving under the influence.    Diagnostic Criteria:   2.)  There is persistent desire or unsuccessful efforts to cut down or control use of the substance.  Met for:  nicotine.  4.)  Craving, or a  strong desire or urge to use the substance.  Met for:  alcohol and nicotine.  8.)  Recurrent use of the substance in which it is physically hazardous.  Met for:  alcohol.  10.)  Tolerance:  either a need for markedly increased amounts of the substance to achieve the desired effect or a markedly diminished effect with continued use of the same amount of the substance.  Met for:  nicotine.  11.)  Withdrawal:  either patient endorses characteristic withdrawal syndrome for the substance or the substance (or closely related substance) is taken to relieve or avoid withdrawal symptoms.  Met for:  nicotine.    Collateral Contact Summary:   Collateral contacts contributing to this assessment:  The patient's electronic medical records were reviewed at time of assessment.    The collateral data provided by this patient's girlfriend/partner, Tati Ricardo, and by this patient's mother, Latasha Wade, supported the patient's account of his chemical use history and his chemical use consequences.  Both Tati and Latasha denied ever having concerns about the patient having a substance use disorder and both of them had described the patient as being a minimal social drinker of alcohol on the weekends.  Both Tati and Latasha felt the patient's pending DWI charge was an exception to the rule for the patient and was the result of the patient using poor judgment on that particular evening versus the patient having a frequent pattern of driving a vehicle after drinking alcohol.       No additional collateral data had been obtained at the time of this documentation.     If court related records were reviewed, summarize here: None    Information from collateral contacts supported/largely agreed with information from the client and associated risk ratings.    Information in this assessment was obtained from the medical record and provided by the patient who is a good historian.        The patient will have open access to his substance use  disorder assessment medical record.    As evidenced by self report and criteria, the patient meets the following DSM-5 Diagnoses: (Sustained by DSM-5 Criteria Listed Above)      1.)  Alcohol Use Disorder Mild - 305.00 (F10.10)   2.)  Tobacco Use Disorder Moderate - 305.10 (F17.200)    Specify if: In early remission:  After full criteria for alcohol/drug use disorder were previously met, none of the criteria for alcohol/drug use disorder have been met for at least 3 months but for less than 12 months (with the exception that Criterion A4,  Craving or a strong desire or urge to use alcohol/drug  may be met).     In sustained remission:   After full criteria for alcohol use disorder were previously met, none of the criteria for alcohol/drug use disorder have been met at any time during a period of 12 months or longer (with the exception that Criterion A4,  Craving or strong desire or urge to use alcohol/drug  may be met).     Specify if:   This additional specifier is used if the individual is in an environment where access to alcohol is restricted.    Mild: Presence of 2-3 symptoms  Moderate: Presence of 4-5 symptoms  Severe: Presence of 6 or more symptoms    Recommendations:     1. Plan for Safety and Risk Management:     Recommended that patient call 911 or go to the local ED should there be a change in any of these risk factors.            Report to child / adult protection services was not needed.    2. [facility] Referrals:      Recommendations:      ADDENDUM ON 10/24/2024: The patient contacted this counselor today on 10/24/2024 to correct his legal history from his initial report of having only 2 DWI charges in 2015 and his pending charge from 9/1/2024 to the corrected total of having 4 DWI charges, with the dates being on 7/11/2011, on 2/21/2012, on 3/28/2015 and his pending DWI charge from 9/1/2024.  The recommendations for the patient will remain the same to attend and complete at a minimum a Level II Driving with  "Care 24-hour DWI class.    1.)  It was recommended the patient refrain from drinking alcohol and driving at all times.  2.)  Follow all the terms and conditions of his court probation, including participating in alcohol and drug screenings with court probation as directed.  3.)  Follow all of the terms and conditions of his [employer] alcohol monitoring device providers   4.)  Attend and complete at a minimum a Level II Driving with Care 24-hour DWI class.    5.)  In addition, if the patient chooses to continue to drink alcohol he should only drink alcohol in a minimal and social manner by not exceeding three \"standard\" alcoholic beverages (12 ounces of regular beer in the 5% alcohol range, 5 ounces of wine in the 12% alcohol range, or 1.5 ounces of distilled spirits in the 40% alcohol range) in any 24-hour period of time and by spreading his use of alcohol out over a long enough period of time given his gender and his body weight to avoid exceeding a 0.04 blood alcohol concentration.         Below is a list with programs which offer both the Level I and the Level II Driving with Care DWI classes.    The Level I Driving with Care class is a 12-hour DWI class.  The Level II Driving with Care class is a 24-hour DWI class.    You have been recommended to attend a Level II Driving with Care 24-hour DWI class.    Program name Telephone number Website   [facility] [facility] [employer]    [phone] [email or web address]/     [facility] [phone] www.accessbehavioral[facility].com/     [employer]   151.390.2076 www.[employer].org/   [employer]. 991.313.1975 http://www.drivingwithcare.org/        [place] [facility] 925-091-2269 https://[facility] Cent[facility].org/resources/driving-with-care/      [facility] [phone] https://www.[employer]Pub.[employer]/driving-with-care-class/        There are additional programs which offer these Level I and Level II " Driving with Care classes, so if none of the above programs work for you to attend a class, you can use an internet search to find additional programs which offer the Driving with Care classes.    It is your responsibility to set up a time to attend and complete the recommended level of the Driving with Care class and it is your responsibility to have the program you attend send a letter of completion of the Driving with Care class to your court , your court probation department and/or to your  as needed.     The patient reported he was willing to follow the above recommendations.      The patient meets criteria for the following levels of care based on ASA Criteria:      Withdrawal Management - No Withdrawal Management Indicated.    Treatment/Recovery Services - The patient does NOT currently meet the ASA Criteria to be referred to any level of care of [facility] at this time.      The patient's placement aligns with the assessment and placement level of care recommendation based on current ASAM Dimension scale ratings.     The patient would like the following family or other support people involved in their treatment:  None at this time.  The patient does not have any history of opioid abuse.      3.  Mental Health Referrals:     The patient did not appear to be in any need of a mental health referral at this time.    4. Clinical Substantiation for the above recommendations: The patient would benefit from increasing his awareness regarding the risks of substance abuse and from working on skills to minimize the potential for having future negative substance use consequences by attending a Level II Driving with Care 24-hour DWI class.    5.  Cultural Concerns:    The patient did not identify having any cultural concerns regarding mental health, physical health, or substance use issues.     6. Recommendations for treatment focus:      Alcohol / Substance Use - See #2. [facility] Referrals  above for details on recommendations.    7. Interactive Complexity: No    8. Safety Plan:  Patient denied any current/recent/lifetime history of suicidal ideation and/or behaviors.  No safety plan indicated at this time.     Provider Name/ Credentials:  NEVA Felix  October 23, 2024

## 2024-10-23 NOTE — PROGRESS NOTES
"  [facility]  [address] [place] [id number]  10/23/2024    Jered San  [address] [facility] [address]      Jered,    This is to verify that Jered San (1995) participated in a substance use disorder assessment in person at [facility] in [place], [place] at [address] in the [place] [address] with WHIT G[place]ola/NEVA at [facility] in [place], [place] on 10/23/2024.    Recommendations:  1.)  It was recommended the patient refrain from drinking alcohol and driving at all times.  2.)  Follow all the terms and conditions of his court probation, including participating in alcohol and drug screenings with court probation as directed.  3.)  Follow all of the terms and conditions of his [employer] alcohol monitoring device providers   4.)  Attend and complete at a minimum a Level II Driving with Care 24-hour DWI class.    5.)  In addition, if the patient chooses to return to drinking alcohol after he is off of court probation, he should only drink alcohol in a minimal and social manner by not exceeding three \"standard\" alcoholic beverages (12 ounces of regular beer in the 5% alcohol range, 5 ounces of wine in the 12% alcohol range, or 1.5 ounces of distilled spirits in the 40% alcohol range) in any 24-hour period of time and by spreading his use of alcohol out over a long enough period of time given his gender and his body weight to avoid exceeding a 0.04 blood alcohol concentration.         Below is a list with programs which offer both the Level I and the Level II Driving with Care DWI classes.    The Level I Driving with Care class is a 12-hour DWI class.  The Level II Driving with Care class is a 24-hour DWI class.    You have been recommended to attend a Level II Driving with Care 24-hour DWI class.    Program name Telephone number Website   [facility]    [phone] [email or web address]     [facility] " 952.518.9016 www.accessbehavioralchange.com/     [employer]   380.284.5587 www.ID.me.org/   [employer]. [phone] http://www.drivingwithcare.org/        [place] [facility] [phone] https://[facility]org/resources/driving-with-care/      [facility] [phone] https://www.[employer]/driving-with-care-class/        There are additional programs which offer these Level I and Level II Driving with Care classes, so if none of the above programs work for you to attend a class, you can use an internet search to find additional programs which offer the Driving with Care classes.    It is your responsibility to set up a time to attend and complete the recommended level of the Driving with Care class and it is your responsibility to have the program you attend send a letter of completion of the Driving with Care class to your court , your court probation department and/or to your  as needed.     The patient reported he was willing to follow the above recommendations.      For any additional issues or needs for follow-up regarding today's on 10/23/2024 substance use disorder assessment, please contact our patient navigator for assistance:   Doris Cline [facility]   Tel #: [phone]     If you have any additional questions or concerns, please feel free to contact me by any of the contact methods listed below.    Sincerely,    WHIT Kim, [facility]  CD Evaluation Counselor  [facility]  [address] [place] [address]  [email or web address]  Tel: [phone]  Fax: [phone]

## 2024-10-24 NOTE — ADDENDUM NOTE
Encounter addended by: Aleksey Caraballo [facility] on: 10/24/2024 2:42 PM   Actions taken: Clinical Note Signed

## 2025-03-30 ENCOUNTER — HEALTH MAINTENANCE LETTER (OUTPATIENT)
Age: 30
End: 2025-03-30

## (undated) DEVICE — PIN GUARD 0.062 GREEN C-062

## (undated) DEVICE — ESU GROUND PAD UNIVERSAL W/O CORD

## (undated) DEVICE — SU VICRYL 4-0 PS-2 18" UND J496H

## (undated) DEVICE — DRAPE MINI C-ARM 4003

## (undated) DEVICE — PREP SKIN SCRUB TRAY 4461A

## (undated) DEVICE — SOL NACL 0.9% IRRIG 1000ML BOTTLE 07138-09

## (undated) DEVICE — DECANTER VIAL 2006S

## (undated) DEVICE — BNDG ROLLER GAUZE CONFORM 4"X4YD 41-54

## (undated) DEVICE — LINEN TOWEL PACK X5 5464

## (undated) DEVICE — SU ETHILON 4-0 FS-2 18" 662H

## (undated) DEVICE — PACK EXTREMITY SOP15EXFSD

## (undated) DEVICE — DRSG KERLIX FLUFFS X5

## (undated) DEVICE — BNDG ELASTIC 4" DBL LENGTH UNSTERILE 6611-14

## (undated) DEVICE — SU ETHILON 5-0 P-3 18" BLACK 698G

## (undated) DEVICE — GLOVE PROTEXIS POWDER FREE 7.5 ORTHOPEDIC 2D73ET75

## (undated) DEVICE — DRAPE POUCH IRR 1016

## (undated) DEVICE — CAST PADDING 4" STERILE 9044S

## (undated) RX ORDER — LIDOCAINE HYDROCHLORIDE 20 MG/ML
INJECTION, SOLUTION EPIDURAL; INFILTRATION; INTRACAUDAL; PERINEURAL
Status: DISPENSED
Start: 2017-10-04

## (undated) RX ORDER — FENTANYL CITRATE 50 UG/ML
INJECTION, SOLUTION INTRAMUSCULAR; INTRAVENOUS
Status: DISPENSED
Start: 2017-10-04

## (undated) RX ORDER — ONDANSETRON 2 MG/ML
INJECTION INTRAMUSCULAR; INTRAVENOUS
Status: DISPENSED
Start: 2017-10-04

## (undated) RX ORDER — PROPOFOL 10 MG/ML
INJECTION, EMULSION INTRAVENOUS
Status: DISPENSED
Start: 2017-10-04

## (undated) RX ORDER — BUPIVACAINE HYDROCHLORIDE 5 MG/ML
INJECTION, SOLUTION EPIDURAL; INTRACAUDAL
Status: DISPENSED
Start: 2017-10-04

## (undated) RX ORDER — LIDOCAINE HYDROCHLORIDE 10 MG/ML
INJECTION, SOLUTION EPIDURAL; INFILTRATION; INTRACAUDAL; PERINEURAL
Status: DISPENSED
Start: 2017-10-04